# Patient Record
Sex: FEMALE | Race: WHITE | NOT HISPANIC OR LATINO | Employment: OTHER | URBAN - METROPOLITAN AREA
[De-identification: names, ages, dates, MRNs, and addresses within clinical notes are randomized per-mention and may not be internally consistent; named-entity substitution may affect disease eponyms.]

---

## 2020-12-03 ENCOUNTER — TRANSFERRED RECORDS (OUTPATIENT)
Dept: HEALTH INFORMATION MANAGEMENT | Facility: CLINIC | Age: 65
End: 2020-12-03

## 2020-12-03 LAB — RETINOPATHY: NEGATIVE

## 2021-08-11 ENCOUNTER — LAB REQUISITION (OUTPATIENT)
Dept: LAB | Facility: CLINIC | Age: 66
End: 2021-08-11
Payer: COMMERCIAL

## 2021-08-11 DIAGNOSIS — U07.1 COVID-19: ICD-10-CM

## 2021-08-12 PROCEDURE — U0003 INFECTIOUS AGENT DETECTION BY NUCLEIC ACID (DNA OR RNA); SEVERE ACUTE RESPIRATORY SYNDROME CORONAVIRUS 2 (SARS-COV-2) (CORONAVIRUS DISEASE [COVID-19]), AMPLIFIED PROBE TECHNIQUE, MAKING USE OF HIGH THROUGHPUT TECHNOLOGIES AS DESCRIBED BY CMS-2020-01-R: HCPCS | Mod: ORL | Performed by: FAMILY MEDICINE

## 2021-08-13 LAB — SARS-COV-2 RNA RESP QL NAA+PROBE: NEGATIVE

## 2021-08-16 ENCOUNTER — LAB REQUISITION (OUTPATIENT)
Dept: LAB | Facility: CLINIC | Age: 66
End: 2021-08-16
Payer: COMMERCIAL

## 2021-08-16 DIAGNOSIS — U07.1 COVID-19: ICD-10-CM

## 2021-08-18 PROCEDURE — U0003 INFECTIOUS AGENT DETECTION BY NUCLEIC ACID (DNA OR RNA); SEVERE ACUTE RESPIRATORY SYNDROME CORONAVIRUS 2 (SARS-COV-2) (CORONAVIRUS DISEASE [COVID-19]), AMPLIFIED PROBE TECHNIQUE, MAKING USE OF HIGH THROUGHPUT TECHNOLOGIES AS DESCRIBED BY CMS-2020-01-R: HCPCS | Mod: ORL | Performed by: FAMILY MEDICINE

## 2021-08-20 LAB — SARS-COV-2 RNA RESP QL NAA+PROBE: NEGATIVE

## 2021-08-25 ENCOUNTER — LAB REQUISITION (OUTPATIENT)
Dept: LAB | Facility: CLINIC | Age: 66
End: 2021-08-25
Payer: COMMERCIAL

## 2021-08-25 DIAGNOSIS — U07.1 COVID-19: ICD-10-CM

## 2021-08-26 PROCEDURE — U0005 INFEC AGEN DETEC AMPLI PROBE: HCPCS | Mod: ORL | Performed by: FAMILY MEDICINE

## 2021-08-28 LAB — SARS-COV-2 RNA RESP QL NAA+PROBE: NEGATIVE

## 2021-09-17 ENCOUNTER — LAB REQUISITION (OUTPATIENT)
Dept: LAB | Facility: CLINIC | Age: 66
End: 2021-09-17
Payer: COMMERCIAL

## 2021-09-17 DIAGNOSIS — U07.1 COVID-19: ICD-10-CM

## 2021-09-17 PROCEDURE — U0003 INFECTIOUS AGENT DETECTION BY NUCLEIC ACID (DNA OR RNA); SEVERE ACUTE RESPIRATORY SYNDROME CORONAVIRUS 2 (SARS-COV-2) (CORONAVIRUS DISEASE [COVID-19]), AMPLIFIED PROBE TECHNIQUE, MAKING USE OF HIGH THROUGHPUT TECHNOLOGIES AS DESCRIBED BY CMS-2020-01-R: HCPCS | Mod: ORL | Performed by: FAMILY MEDICINE

## 2021-09-19 LAB — SARS-COV-2 RNA RESP QL NAA+PROBE: NOT DETECTED

## 2021-09-20 PROCEDURE — U0003 INFECTIOUS AGENT DETECTION BY NUCLEIC ACID (DNA OR RNA); SEVERE ACUTE RESPIRATORY SYNDROME CORONAVIRUS 2 (SARS-COV-2) (CORONAVIRUS DISEASE [COVID-19]), AMPLIFIED PROBE TECHNIQUE, MAKING USE OF HIGH THROUGHPUT TECHNOLOGIES AS DESCRIBED BY CMS-2020-01-R: HCPCS | Mod: ORL | Performed by: FAMILY MEDICINE

## 2021-09-22 ENCOUNTER — LAB REQUISITION (OUTPATIENT)
Dept: LAB | Facility: CLINIC | Age: 66
End: 2021-09-22
Payer: COMMERCIAL

## 2021-09-22 DIAGNOSIS — U07.1 COVID-19: ICD-10-CM

## 2021-09-23 LAB — SARS-COV-2 RNA RESP QL NAA+PROBE: NOT DETECTED

## 2022-05-25 ENCOUNTER — LAB REQUISITION (OUTPATIENT)
Dept: LAB | Facility: CLINIC | Age: 67
End: 2022-05-25
Payer: COMMERCIAL

## 2022-05-25 DIAGNOSIS — U07.1 COVID-19: ICD-10-CM

## 2022-05-26 PROCEDURE — U0005 INFEC AGEN DETEC AMPLI PROBE: HCPCS | Mod: ORL | Performed by: INTERNAL MEDICINE

## 2022-05-27 LAB — SARS-COV-2 RNA RESP QL NAA+PROBE: NEGATIVE

## 2022-06-01 ENCOUNTER — LAB REQUISITION (OUTPATIENT)
Dept: LAB | Facility: CLINIC | Age: 67
End: 2022-06-01
Payer: COMMERCIAL

## 2022-06-01 DIAGNOSIS — U07.1 COVID-19: ICD-10-CM

## 2022-06-15 ENCOUNTER — LAB REQUISITION (OUTPATIENT)
Dept: LAB | Facility: CLINIC | Age: 67
End: 2022-06-15
Payer: COMMERCIAL

## 2022-06-15 DIAGNOSIS — U07.1 COVID-19: ICD-10-CM

## 2022-06-16 PROCEDURE — U0005 INFEC AGEN DETEC AMPLI PROBE: HCPCS | Mod: ORL | Performed by: INTERNAL MEDICINE

## 2022-06-17 LAB — SARS-COV-2 RNA RESP QL NAA+PROBE: NEGATIVE

## 2022-06-22 ENCOUNTER — LAB REQUISITION (OUTPATIENT)
Dept: LAB | Facility: CLINIC | Age: 67
End: 2022-06-22
Payer: COMMERCIAL

## 2022-06-22 DIAGNOSIS — U07.1 COVID-19: ICD-10-CM

## 2022-07-26 DIAGNOSIS — H35.30 ARMD (AGE-RELATED MACULAR DEGENERATION), BILATERAL: Primary | ICD-10-CM

## 2022-07-31 ENCOUNTER — HEALTH MAINTENANCE LETTER (OUTPATIENT)
Age: 67
End: 2022-07-31

## 2022-08-08 ASSESSMENT — ANXIETY QUESTIONNAIRES
3. WORRYING TOO MUCH ABOUT DIFFERENT THINGS: MORE THAN HALF THE DAYS
2. NOT BEING ABLE TO STOP OR CONTROL WORRYING: MORE THAN HALF THE DAYS
GAD7 TOTAL SCORE: 12
8. IF YOU CHECKED OFF ANY PROBLEMS, HOW DIFFICULT HAVE THESE MADE IT FOR YOU TO DO YOUR WORK, TAKE CARE OF THINGS AT HOME, OR GET ALONG WITH OTHER PEOPLE?: NOT DIFFICULT AT ALL
GAD7 TOTAL SCORE: 12
7. FEELING AFRAID AS IF SOMETHING AWFUL MIGHT HAPPEN: NEARLY EVERY DAY
1. FEELING NERVOUS, ANXIOUS, OR ON EDGE: NEARLY EVERY DAY
IF YOU CHECKED OFF ANY PROBLEMS ON THIS QUESTIONNAIRE, HOW DIFFICULT HAVE THESE PROBLEMS MADE IT FOR YOU TO DO YOUR WORK, TAKE CARE OF THINGS AT HOME, OR GET ALONG WITH OTHER PEOPLE: NOT DIFFICULT AT ALL
6. BECOMING EASILY ANNOYED OR IRRITABLE: NOT AT ALL
7. FEELING AFRAID AS IF SOMETHING AWFUL MIGHT HAPPEN: NEARLY EVERY DAY
4. TROUBLE RELAXING: MORE THAN HALF THE DAYS
5. BEING SO RESTLESS THAT IT IS HARD TO SIT STILL: NOT AT ALL

## 2022-08-10 ENCOUNTER — OFFICE VISIT (OUTPATIENT)
Dept: OPHTHALMOLOGY | Facility: CLINIC | Age: 67
End: 2022-08-10
Attending: OPHTHALMOLOGY
Payer: COMMERCIAL

## 2022-08-10 DIAGNOSIS — H35.053 CNVM (CHOROIDAL NEOVASCULAR MEMBRANE), BILATERAL: ICD-10-CM

## 2022-08-10 DIAGNOSIS — E11.3299 NONPROLIFERATIVE DIABETIC RETINOPATHY ASSOCIATED WITH TYPE 2 DIABETES MELLITUS (H): ICD-10-CM

## 2022-08-10 DIAGNOSIS — H43.813 POSTERIOR VITREOUS DETACHMENT OF BOTH EYES: ICD-10-CM

## 2022-08-10 DIAGNOSIS — H35.30 ARMD (AGE-RELATED MACULAR DEGENERATION), BILATERAL: ICD-10-CM

## 2022-08-10 DIAGNOSIS — H44.2A3 BOTH EYES AFFECTED BY DEGENERATIVE MYOPIA WITH CHOROIDAL NEOVASCULARIZATION: Primary | ICD-10-CM

## 2022-08-10 DIAGNOSIS — H52.13 MYOPIA OF BOTH EYES: ICD-10-CM

## 2022-08-10 PROCEDURE — 92235 FLUORESCEIN ANGRPH MLTIFRAME: CPT | Mod: 26 | Performed by: STUDENT IN AN ORGANIZED HEALTH CARE EDUCATION/TRAINING PROGRAM

## 2022-08-10 PROCEDURE — G0463 HOSPITAL OUTPT CLINIC VISIT: HCPCS | Mod: 25

## 2022-08-10 PROCEDURE — 99207 FUNDUS AUTOFLUORESCENCE IMAGE (FAF) OU (BOTH EYES): CPT | Mod: 26 | Performed by: STUDENT IN AN ORGANIZED HEALTH CARE EDUCATION/TRAINING PROGRAM

## 2022-08-10 PROCEDURE — 92134 CPTRZ OPH DX IMG PST SGM RTA: CPT | Mod: 26 | Performed by: STUDENT IN AN ORGANIZED HEALTH CARE EDUCATION/TRAINING PROGRAM

## 2022-08-10 PROCEDURE — 92250 FUNDUS PHOTOGRAPHY W/I&R: CPT | Performed by: OPHTHALMOLOGY

## 2022-08-10 PROCEDURE — 92134 CPTRZ OPH DX IMG PST SGM RTA: CPT | Performed by: OPHTHALMOLOGY

## 2022-08-10 PROCEDURE — 92235 FLUORESCEIN ANGRPH MLTIFRAME: CPT | Performed by: OPHTHALMOLOGY

## 2022-08-10 PROCEDURE — 92004 COMPRE OPH EXAM NEW PT 1/>: CPT | Mod: 25 | Performed by: STUDENT IN AN ORGANIZED HEALTH CARE EDUCATION/TRAINING PROGRAM

## 2022-08-10 PROCEDURE — 250N000011 HC RX IP 250 OP 636: Performed by: OPHTHALMOLOGY

## 2022-08-10 PROCEDURE — 67028 INJECTION EYE DRUG: CPT | Mod: LT | Performed by: OPHTHALMOLOGY

## 2022-08-10 PROCEDURE — 67028 INJECTION EYE DRUG: CPT | Mod: LT | Performed by: STUDENT IN AN ORGANIZED HEALTH CARE EDUCATION/TRAINING PROGRAM

## 2022-08-10 RX ORDER — METFORMIN HCL 500 MG
2000 TABLET, EXTENDED RELEASE 24 HR ORAL
COMMUNITY
Start: 2022-05-25 | End: 2022-12-14

## 2022-08-10 RX ORDER — GLIPIZIDE 5 MG/1
5 TABLET, FILM COATED, EXTENDED RELEASE ORAL DAILY
COMMUNITY
Start: 2022-06-16 | End: 2022-08-23

## 2022-08-10 RX ADMIN — Medication 1.25 MG: at 14:53

## 2022-08-10 ASSESSMENT — TONOMETRY
IOP_METHOD: TONOPEN
OD_IOP_MMHG: 15
OS_IOP_MMHG: 14

## 2022-08-10 ASSESSMENT — VISUAL ACUITY
OD_CC+: -1
OS_CC: 20/70
METHOD: SNELLEN - LINEAR
OS_CC+: -2
CORRECTION_TYPE: GLASSES
OD_CC: 20/60
OS_PH_CC: 20/70

## 2022-08-10 ASSESSMENT — SLIT LAMP EXAM - LIDS
COMMENTS: NORMAL
COMMENTS: NORMAL

## 2022-08-10 ASSESSMENT — REFRACTION_WEARINGRX
OD_CYLINDER: +0.75
OD_ADD: +2.25
OS_AXIS: 081
OS_CYLINDER: +0.50
OD_SPHERE: -11.25
OS_SPHERE: -12.50
SPECS_TYPE: PAL
OS_ADD: +2.25
OD_AXIS: 131

## 2022-08-10 ASSESSMENT — EXTERNAL EXAM - LEFT EYE: OS_EXAM: NORMAL

## 2022-08-10 ASSESSMENT — CONF VISUAL FIELD
METHOD: COUNTING FINGERS
OD_INFERIOR_TEMPORAL_RESTRICTION: 3
OS_NORMAL: 1

## 2022-08-10 ASSESSMENT — CUP TO DISC RATIO
OS_RATIO: 0.3
OD_RATIO: 0.4

## 2022-08-10 ASSESSMENT — EXTERNAL EXAM - RIGHT EYE: OD_EXAM: NORMAL

## 2022-08-10 NOTE — NURSING NOTE
"Chief Complaints and History of Present Illnesses   Patient presents with     Consult For     Macular Degeneration referred by Murtaza Trinh MD.     Chief Complaint(s) and History of Present Illness(es)     Consult For     Laterality: both eyes    Context: night driving    Course: gradually worsening    Associated symptoms: glare and floaters.  Negative for eye pain and flashes    Treatments tried: eye drops    Pain scale: 0/10    Comments: Macular Degeneration referred by Murtaza Trinh MD.              Comments     Patient would like to establish care here, after a location change.  She has had myopic degeneration of each eye for many years.  For the past 7-10 years she has gotten shots in her left eye and then in the past 18 months she has had injections in her right eye as well.    Her vision is \"not great\".  She tells me that she needs to get a glasses prescription change and she thinks her cataracts are worsening.    She uses Zaditor daily in each eye.    Justa Cardenas, COT 12:23 PM  August 10, 2022                     "

## 2022-08-10 NOTE — PROGRESS NOTES
CC -   Myopic Degeneration    INTERVAL HISTORY - Initial visit with me    HPI -   Yuko Blanchard is a  66 year old year-old patient presenting for evaluation of myopic degeneration OU and diabetic eye exam. She was previously following with Dr. Murtaza Trinh MD at [facility]. She would like to establish care here as he recently moved to a different location.    Past ocular history of Myopic Degeneration with Retinal Neovascularization OU, Type 2 DM with no insulin use (diagnosed approximately 2018), ERM OS, PVD OU, and cataract OU. Denies curtains, flashing lights, increase in floaters. Reports stable vision.    She has a history of Avastin injections OU (uncertain about the exact number); starting roughly 8 years prior in the left eye and 1.5 years ago in the right eye. She reports most recently, she was extended to a 4 month interval.     Her last injection each eye 4/7/2022    PAST OCULAR SURGERY  -Denies ocular surgery and laser procedures    RETINAL IMAGING:  OCT 08/10/22  OD - PHF off of macula; ERM with schisis and no obvious subretinal fluid  OS - PHF off of macula; ERM with schisis and subretinal fluid inferiorly    FAF 08/10/22  OD- areas of hypo-AF nasal and inferonasal to fovea; window defect at macula but no active CNV  OS- Scattered hyper-AF within large area of hypo-AF inferior macula with leakage     FAF compatible with examination and findings    ASSESSMENT & PLAN    1. Myopic Degeneration with CNV OU    -Last Avastin OU 17 weeks, 6 days prior; on 4 month schedule previously   -No active hemorrhage noted today   -Avastin OS today and f/u 1 month for repeat OCT macula    2. 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     Complete documentation of historical and exam elements from today's encounter can be found in the full encounter summary report (not reduplicated in this progress note). I personally obtained the chief complaint(s) and history of present illness.  I confirmed and edited as necessary the review of systems, past medical/surgical history, family history, social history, and examination findings as documented by others; and I examined the patient myself. I personally reviewed the relevant tests, images, and reports as documented above. I formulated and edited as necessary the assessment and plan and discussed the findings and management plan with the patient and family.     Solo Junior MD

## 2022-08-10 NOTE — PROGRESS NOTES
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  Eye exam shows mild proliferative disease right eye - due in 8/2023 again.   Foot exam normal.  Will check microalbuminuria.  BP within target - will check lipid.  See in 3 months for well exam   - Hemoglobin A1c  - Albumin Random Urine Quantitative with Creat Ratio  - Basic metabolic panel  - Lipid Profile    Encounter for screening mammogram for breast cancer  Due for mammogram  - MA Screening Digital Bilateral    History of colonic polyps  Last colonoscopy several years ago - overdue for colonoscopy  - Adult GI  Referral - Procedure Only    Menopause  Never had a DXA - discussed calcium and vit D  - will get DXA   - Dexa hip/pelvis/spine    BMI:   Estimated body mass index is 27.21 kg/m  as calculated from the following:    Height as of this encounter: 1.638 m (5' 4.5\").    Weight as of this encounter: 73 kg (161 lb).       Regular exercise  Make appt for a wellness exam within 3 months.     Return in about 3 months (around 11/15/2022).    Libby Black MD PhD  [facility]    Time note ((n4, 45'): The total time (on the date of service) for this service was 45 minutes, including discussion/face-to-face, chart review, interpretation not otherwise reported, documentation, and updating of the computerized record.      Dorcas Huntley is a 66 year old, presenting for the following health issues:Diabetes     HPI 65 yo female new patient,  has had type 2 diabetes for about 3 yrs.  On metformin and glipizide.  Last seen for diabetes about a year ago with Jean Marie.  Does bs testing bid:  fasting and 2 hrs pp.  Fasting sugars run around   and 2 hr pp 80 - 120.  No periods of low sugar.  A1C  was 6.1  " "7/2021  Eye exam:  8/2022  Has mild NPDR right eye   Foot exam - no foot sores - no foot numbness    HTN - no hx of HTN - no meds - no home cuff    Lipids: not on statin -      Exercise: walks 1x/day - 60 min total/day   Diet:  Follows the diet  ASA: no    Answers for HPI/ROS submitted by the patient on 8/8/2022  TERRA 7 TOTAL SCORE: 12        Review of Systems endorses visual difficulty, allergies, frequency and urgency, joint pain, stiffness, anxiety and the rest of the complete ROS is negative other that as mentioned above.         Objective    /81   Pulse 67   Ht 1.638 m (5' 4.5\")   Wt 73 kg (161 lb)   BMI 27.21 kg/m    Body mass index is 27.21 kg/m .  Physical Exam   GENERAL: healthy, alert and no distress  NECK: no adenopathy, no asymmetry, masses, or scars and thyroid normal to palpation  RESP: lungs diminished bs bilaterally L>R   No audible  rales, rhonchi or wheezes  CV: regular rate and rhythm, normal S1 S2, no S3 or S4, no murmur, click or rub, no peripheral edema and peripheral pulses present 1-2/4 bilaterally\ABDOMEN: soft, nontender, no hepatosplenomegaly, no masses and bowel sounds normal  MS: no gross musculoskeletal defects noted, no edema  SKIN: no suspicious lesions or rashes  NEURO: Normal strength and tone, mentation intact and speech normal  PSYCH: mentation appears normal, affect normal/bright  LYMPH: no cervical, supraclavicular  adenopathy        Diabetic Foot Screen:  Any complaints of increased pain or numbness ? No   Is there a foot ulcer now or a history of foot ulcer? No   Does the foot have an abnormal shape? No   Are the nails thick, too long or ingrown? No   Are there any redness or open areas? No            Sensation Testing done at all points on the diagram with monofilament     Right Foot: Sensation Normal at all points  Left Foot: Sensation Normal at all points     Risk Category: 0- No loss of protective sensation  Performed by Libby Black MD PhD            "         .  ..

## 2022-08-15 ENCOUNTER — OFFICE VISIT (OUTPATIENT)
Dept: FAMILY MEDICINE | Facility: CLINIC | Age: 67
End: 2022-08-15
Attending: FAMILY MEDICINE
Payer: COMMERCIAL

## 2022-08-15 VITALS
HEART RATE: 67 BPM | BODY MASS INDEX: 26.82 KG/M2 | SYSTOLIC BLOOD PRESSURE: 135 MMHG | WEIGHT: 161 LBS | HEIGHT: 65 IN | DIASTOLIC BLOOD PRESSURE: 81 MMHG

## 2022-08-15 DIAGNOSIS — Z12.31 ENCOUNTER FOR SCREENING MAMMOGRAM FOR BREAST CANCER: ICD-10-CM

## 2022-08-15 DIAGNOSIS — Z86.0100 HISTORY OF COLONIC POLYPS: ICD-10-CM

## 2022-08-15 DIAGNOSIS — Z78.0 MENOPAUSE: ICD-10-CM

## 2022-08-15 DIAGNOSIS — E11.3291: Primary | ICD-10-CM

## 2022-08-15 PROCEDURE — G0463 HOSPITAL OUTPT CLINIC VISIT: HCPCS

## 2022-08-15 PROCEDURE — 99204 OFFICE O/P NEW MOD 45 MIN: CPT | Performed by: FAMILY MEDICINE

## 2022-08-15 RX ORDER — BLOOD SUGAR DIAGNOSTIC
STRIP MISCELLANEOUS
COMMUNITY
Start: 2022-07-29

## 2022-08-15 NOTE — PATIENT INSTRUCTIONS
Blood work when fasting  Schedule mammogram- tomograms  Schedule colonoscopy  Schedule DEXA at the [facility] on [place]  Schedule a wellness  exam visit  Urine specimen when get blood work     Eye exam for  diabetes is 8/2023

## 2022-08-16 ENCOUNTER — TELEPHONE (OUTPATIENT)
Dept: GASTROENTEROLOGY | Facility: CLINIC | Age: 67
End: 2022-08-16

## 2022-08-16 NOTE — TELEPHONE ENCOUNTER
Screening Questions    BlueKIND OF PREP RedLOCATION [review exclusion criteria] GreenSEDATION TYPE      1. Are you active on mychart? Y    2. What insurance is in the chart? [employer]     3.   Ordering/Referring Provider: Libby Black MD PhD in [facility]    4. BMI   (If greater than 40 review exclusion criteria [PAC APPT IF [MAC] @ [facility])  27.2  [If yes, BMI OVER 40-EXTENDED PREP]      **(Sedation review/consideration needed)**  Do you have a legal guardian or Medical Power of    and/or are you able to give consent for your medical care?     Y    5. Have you had a positive covid test in the last 90 days?   N - N    6.  Are you currently on dialysis?   N [ If yes, G-PREP & HOSPITAL setting ONLY]     7.  Do you have chronic kidney disease?  N [ If yes, G-PREP ]    8.   Do you have a diagnosis of diabetes?   Y  [ If yes, G-PREP ]    9.  On a regular basis do you go 3-5 days between bowel movements?   N   [ If yes, EXTENDED PREP]    10.  Are you taking any prescription pain medications on a routine schedule?    N - N [ If yes, EXTENDED PREP] [If yes, MAC]      11.   Do you have any chemical dependencies such as alcohol, street drugs, or methadone?    N [If yes, MAC]    12.   Do you have any history of post-traumatic stress syndrome, severe anxiety or history of psychosis?    N  [If yes, MAC]    13.  [FEMALES] Are you currently pregnant?     If yes, how many weeks?       Respiratory/Heart Screening:  [If yes to any of the following HOSPITAL setting only]     14. Do you have Pulmonary Hypertension [Lungs]?   N       15. Do you have UNCONTROLLED asthma?   N     16.  Do you use daily home oxygen?  N      17. Do you have mod to severe Obstructive Sleep Apnea?         (OKAY @ [facility]  [facility]  SH  PH  RI  MG - if pt is not on OXYGEN)  N      18.   Have you had a heart or lung transplant?   N      19.   Have you had a stroke or Transient ischemic attack (TIA - aka  mini stroke ) within 6 months?  (If yes,  please review exclusion criteria)  N     20.   In the past 6 months, have you had any heart related issues including cardiomyopathy or heart attack?   N           If yes, did it require cardiac stenting or other implantable device?   N      21.   Do you have any implantable devices in your body (pacemaker, defib, LVAD)? (If yes, please review exclusion criteria)  N   22.  Do you take the medication Phentermine?  NO        23. Do you take nitroglycerin?   N           If yes, how often? N  (if yes, HOSPITAL setting ONLY)    24.  Are you currently taking any blood thinners?    [If yes, INFORM patient to follw up w/ ORDERING PROVIDER FOR BRIDGING INSTRUCTIONS]     N    25.   Do you transfer independently?                (If NO, please HOSPITAL setting ONLY)  Y    26.   Preferred LOCAL Pharmacy for Pre Prescription:      [facility]?  [id number] [facility]    Scheduling Details  (Please ask for phone number if not scheduled by patient)    Caller : Yuko  Date of Procedure: 10/13   Surgeon: CLAIRE Holley  [place] [facility]    Sedation Type: CS l per protocol & per order  Conscious Sedation- Needs  for 6 hours after the procedure  MAC/General-Needs  for 24 hours after procedure    n :[Pre-op Required] at [facility]  SH  MG and OR for MAC sedation   (advise patient they will need a pre-op WITH IN 30 DAYS of procedure date)     Type of Procedure Scheduled:   Lower Endoscopy [Colonoscopy]    Which Colonoscopy Prep was Sent?:   golytely - diabetes    [facility] PATIENTS & [facility] PATIENTS NEEDS EXTENDED PREP    Informed patient they will need an adult  y  Cannot take any type of public or medical transportation alone    Pre-Procedure Covid test to be completed at [facility] or Externally: home    Confirmed Nurse will call to complete assessment y    Additional comments: none

## 2022-08-23 DIAGNOSIS — E11.3291: Primary | ICD-10-CM

## 2022-08-23 NOTE — TELEPHONE ENCOUNTER
[facility] Call Center    Phone Message    May a detailed message be left on voicemail: yes     Reason for Call: Medication Question or concern regarding medication   Prescription Clarification  Name of Medication: glipiZIDE (GLUCOTROL XL) 5 MG 24 hr tablet  Prescribing Provider: Dr. Black was going to refill 8/15/22   Pharmacy: [facility] mail order [facility] #[phone]   What on the order needs clarification? Yuko is calling stating that the prescription for glipiZIDE (GLUCOTROL XL) 5 MG 24 hr tablet is supposed to [facility] as they sent a fax to the clinic to get Dr. Black's approval for mail order. Please complete and return, thanks!          Action Taken: Other: whs    Travel Screening: Not Applicable

## 2022-08-24 RX ORDER — GLIPIZIDE 5 MG/1
5 TABLET, FILM COATED, EXTENDED RELEASE ORAL DAILY
Qty: 90 TABLET | Refills: 3 | Status: SHIPPED | OUTPATIENT
Start: 2022-08-24 | End: 2022-12-19

## 2022-09-07 DIAGNOSIS — H35.30 ARMD (AGE-RELATED MACULAR DEGENERATION), BILATERAL: Primary | ICD-10-CM

## 2022-09-09 ENCOUNTER — ANCILLARY PROCEDURE (OUTPATIENT)
Dept: MAMMOGRAPHY | Facility: CLINIC | Age: 67
End: 2022-09-09
Attending: FAMILY MEDICINE
Payer: COMMERCIAL

## 2022-09-09 DIAGNOSIS — Z12.31 ENCOUNTER FOR SCREENING MAMMOGRAM FOR BREAST CANCER: ICD-10-CM

## 2022-09-09 PROCEDURE — 77067 SCR MAMMO BI INCL CAD: CPT | Mod: GC | Performed by: RADIOLOGY

## 2022-09-14 ENCOUNTER — OFFICE VISIT (OUTPATIENT)
Dept: OPHTHALMOLOGY | Facility: CLINIC | Age: 67
End: 2022-09-14
Attending: OPHTHALMOLOGY
Payer: COMMERCIAL

## 2022-09-14 DIAGNOSIS — H44.2A3 BOTH EYES AFFECTED BY DEGENERATIVE MYOPIA WITH CHOROIDAL NEOVASCULARIZATION: ICD-10-CM

## 2022-09-14 DIAGNOSIS — H43.813 POSTERIOR VITREOUS DETACHMENT OF BOTH EYES: ICD-10-CM

## 2022-09-14 DIAGNOSIS — H35.052 CHOROIDAL NEOVASCULAR MEMBRANE OF LEFT EYE: Primary | ICD-10-CM

## 2022-09-14 DIAGNOSIS — E11.3299 NONPROLIFERATIVE DIABETIC RETINOPATHY ASSOCIATED WITH TYPE 2 DIABETES MELLITUS (H): ICD-10-CM

## 2022-09-14 PROCEDURE — 92134 CPTRZ OPH DX IMG PST SGM RTA: CPT | Performed by: OPHTHALMOLOGY

## 2022-09-14 PROCEDURE — 250N000011 HC RX IP 250 OP 636: Performed by: OPHTHALMOLOGY

## 2022-09-14 PROCEDURE — G0463 HOSPITAL OUTPT CLINIC VISIT: HCPCS

## 2022-09-14 PROCEDURE — 92014 COMPRE OPH EXAM EST PT 1/>: CPT | Mod: 25 | Performed by: OPHTHALMOLOGY

## 2022-09-14 PROCEDURE — 67028 INJECTION EYE DRUG: CPT | Mod: LT | Performed by: OPHTHALMOLOGY

## 2022-09-14 RX ADMIN — Medication 1.25 MG: at 10:30

## 2022-09-14 ASSESSMENT — CUP TO DISC RATIO
OD_RATIO: 0.4
OS_RATIO: 0.3

## 2022-09-14 ASSESSMENT — SLIT LAMP EXAM - LIDS
COMMENTS: NORMAL
COMMENTS: NORMAL

## 2022-09-14 ASSESSMENT — VISUAL ACUITY
OS_CC+: -1
METHOD: SNELLEN - LINEAR
OS_PH_CC: 20/70
OS_CC: 20/70
OD_PH_CC: 20/70
CORRECTION_TYPE: GLASSES
OD_CC: 20/100
OS_PH_CC+: +2

## 2022-09-14 ASSESSMENT — EXTERNAL EXAM - RIGHT EYE: OD_EXAM: NORMAL

## 2022-09-14 ASSESSMENT — EXTERNAL EXAM - LEFT EYE: OS_EXAM: NORMAL

## 2022-09-14 ASSESSMENT — TONOMETRY
OS_IOP_MMHG: 15
IOP_METHOD: TONOPEN
OD_IOP_MMHG: 18

## 2022-09-14 ASSESSMENT — CONF VISUAL FIELD
OD_NORMAL: 1
OS_INFERIOR_TEMPORAL_RESTRICTION: 3
METHOD: COUNTING FINGERS

## 2022-09-14 NOTE — NURSING NOTE
Chief Complaints and History of Present Illnesses   Patient presents with     Follow Up     Both eyes affected by degenerative myopia with choroidal neovascularization     Chief Complaint(s) and History of Present Illness(es)     Follow Up     Comments: Both eyes affected by degenerative myopia with choroidal neovascularization              Comments     Pt states no change in VA since last visit  Floaters same as before  No flashes, eye pain or redness    Margot Hernandez COT 9:37 AM September 14, 2022

## 2022-09-14 NOTE — PROGRESS NOTES
CC -   Myopic Degeneration    INTERVAL HISTORY -vision stable; no change after the injection last visit    HPI -   Yuko Blanchard is a  66 year old year-old patient presenting for evaluation of myopic degeneration OU and diabetic eye exam. She was previously following with Dr. Murtaza Trinh MD at [facility]. She would like to establish care here as he recently moved to a different location.    Past ocular history of Myopic Degeneration with Retinal Neovascularization OU, Type 2 DM with no insulin use (diagnosed approximately 2018), ERM OS, PVD OU, and cataract OU. Denies curtains, flashing lights, increase in floaters. Reports stable vision.    She has a history of Avastin injections OU (uncertain about the exact number); starting roughly 8 years prior in the left eye and 1.5 years ago in the right eye. She reports most recently, she was extended to a 4 month interval.     Her last injection each eye 4/7/2022    PAST OCULAR SURGERY  -Denies ocular surgery and laser procedures    RETINAL IMAGING:  OCT 9/14/22  OD - PHF off of macula; ERM with schisis and no obvious subretinal fluid  OS - PHF off of macula; ERM with schisis and subretinal fluid inferiorly; stable or maybe slight improvement    FAF 08/10/22  OD- areas of hypo-AF nasal and inferonasal to fovea; window defect at macula but no active CNV  OS- Scattered hyper-AF within large area of hypo-AF inferior macula with leakage     FAF compatible with examination and findings    ASSESSMENT & PLAN    1. Myopic Degeneration with CNV OU    -manu Avastin each eye; came here after a 17 weeks hiatus    -No active hemorrhage noted    -right eye no signs of active CNVM; left eye subretinal fluid likely in the setting of myopic degeneration vs active CNVM   -avastin left eye 8/10/22: minimal change in anatomy and no change in vision   -Avastin OS 8/10/22: OCT today 9/14/22 minimal or no improvement   - repeat intravitreal avastin today, RTC 4 - 6 w; if  no change in vision and OCT: will continue to observe with no injection    2. 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 I personally obtained the chief complaint(s) and history of present illness.  I confirmed and edited as necessary the review of systems, past medical/surgical history, family history, social history, and examination findings as documented by others; and I examined the patient myself. I personally reviewed the relevant tests, images, and reports as documented above. I formulated and edited as necessary the assessment and plan and discussed the findings and management plan with the patient and family.     Solo Junior MD

## 2022-10-04 ENCOUNTER — TELEPHONE (OUTPATIENT)
Dept: GASTROENTEROLOGY | Facility: CLINIC | Age: 67
End: 2022-10-04

## 2022-10-04 DIAGNOSIS — Z12.11 ENCOUNTER FOR SCREENING COLONOSCOPY: Primary | ICD-10-CM

## 2022-10-04 RX ORDER — BISACODYL 5 MG/1
TABLET, DELAYED RELEASE ORAL
Qty: 4 TABLET | Refills: 0 | Status: SHIPPED | OUTPATIENT
Start: 2022-10-04 | End: 2022-10-18

## 2022-10-04 NOTE — TELEPHONE ENCOUNTER
Pre assessment questions completed for upcoming colonoscopy  procedure scheduled on 10/13/22    COVID policy reviewed. Patient to complete rapid antigen test one to two days before their scheduled procedure. Patient to bring photo of the results when they come in for their procedure.    Reviewed procedural arrival time 1030 and facility location [facility].    Designated  policy reviewed. Instructed to have someone stay 6 hours post procedure.     Procedure indication: screening     Bowel prep recommendation: Golytely d/t DM    Golytely  [place] [address]. Prep instructions sent via [facility].    Reviewed Golytely prep instructions. No fiber/iron supplements or foods that contain nuts/seeds 7 days prior to procedure.     Anticoagulation/blood thinners? no    Patient verbalized understanding and had no questions or concerns at this time.    Patricia Martinez RN

## 2022-10-05 DIAGNOSIS — H44.2A3 BOTH EYES AFFECTED BY DEGENERATIVE MYOPIA WITH CHOROIDAL NEOVASCULARIZATION: Primary | ICD-10-CM

## 2022-10-13 ENCOUNTER — HOSPITAL ENCOUNTER (OUTPATIENT)
Facility: AMBULATORY SURGERY CENTER | Age: 67
Discharge: HOME | End: 2022-10-13
Attending: INTERNAL MEDICINE | Admitting: INTERNAL MEDICINE
Payer: COMMERCIAL

## 2022-10-13 VITALS
WEIGHT: 161 LBS | DIASTOLIC BLOOD PRESSURE: 67 MMHG | HEART RATE: 61 BPM | RESPIRATION RATE: 20 BRPM | HEIGHT: 65 IN | BODY MASS INDEX: 26.82 KG/M2 | SYSTOLIC BLOOD PRESSURE: 115 MMHG | TEMPERATURE: 97.3 F | OXYGEN SATURATION: 94 %

## 2022-10-13 LAB
COLONOSCOPY: NORMAL
GLUCOSE BLDC GLUCOMTR-MCNC: 127 MG/DL (ref 70–99)

## 2022-10-13 PROCEDURE — 82962 GLUCOSE BLOOD TEST: CPT | Performed by: PATHOLOGY

## 2022-10-13 PROCEDURE — 45380 COLONOSCOPY AND BIOPSY: CPT | Mod: PT

## 2022-10-13 PROCEDURE — 88305 TISSUE EXAM BY PATHOLOGIST: CPT | Mod: TC | Performed by: INTERNAL MEDICINE

## 2022-10-13 PROCEDURE — 88305 TISSUE EXAM BY PATHOLOGIST: CPT | Mod: 26 | Performed by: STUDENT IN AN ORGANIZED HEALTH CARE EDUCATION/TRAINING PROGRAM

## 2022-10-13 RX ORDER — ONDANSETRON 2 MG/ML
4 INJECTION INTRAMUSCULAR; INTRAVENOUS
Status: DISCONTINUED | OUTPATIENT
Start: 2022-10-13 | End: 2022-10-13 | Stop reason: HOSPADM

## 2022-10-13 RX ORDER — NALOXONE HYDROCHLORIDE 0.4 MG/ML
0.4 INJECTION, SOLUTION INTRAMUSCULAR; INTRAVENOUS; SUBCUTANEOUS
Status: DISCONTINUED | OUTPATIENT
Start: 2022-10-13 | End: 2022-10-15 | Stop reason: HOSPADM

## 2022-10-13 RX ORDER — FENTANYL CITRATE 50 UG/ML
INJECTION, SOLUTION INTRAMUSCULAR; INTRAVENOUS DAILY PRN
Status: DISCONTINUED | OUTPATIENT
Start: 2022-10-13 | End: 2022-10-13 | Stop reason: HOSPADM

## 2022-10-13 RX ORDER — SODIUM CHLORIDE, SODIUM LACTATE, POTASSIUM CHLORIDE, CALCIUM CHLORIDE 600; 310; 30; 20 MG/100ML; MG/100ML; MG/100ML; MG/100ML
INJECTION, SOLUTION INTRAVENOUS CONTINUOUS
Status: DISCONTINUED | OUTPATIENT
Start: 2022-10-13 | End: 2022-10-13 | Stop reason: HOSPADM

## 2022-10-13 RX ORDER — NALOXONE HYDROCHLORIDE 0.4 MG/ML
0.2 INJECTION, SOLUTION INTRAMUSCULAR; INTRAVENOUS; SUBCUTANEOUS
Status: DISCONTINUED | OUTPATIENT
Start: 2022-10-13 | End: 2022-10-15 | Stop reason: HOSPADM

## 2022-10-13 RX ORDER — PROCHLORPERAZINE MALEATE 5 MG
5 TABLET ORAL EVERY 6 HOURS PRN
Status: DISCONTINUED | OUTPATIENT
Start: 2022-10-13 | End: 2022-10-15 | Stop reason: HOSPADM

## 2022-10-13 RX ORDER — ONDANSETRON 2 MG/ML
4 INJECTION INTRAMUSCULAR; INTRAVENOUS EVERY 6 HOURS PRN
Status: DISCONTINUED | OUTPATIENT
Start: 2022-10-13 | End: 2022-10-15 | Stop reason: HOSPADM

## 2022-10-13 RX ORDER — FLUMAZENIL 0.1 MG/ML
0.2 INJECTION, SOLUTION INTRAVENOUS
Status: DISCONTINUED | OUTPATIENT
Start: 2022-10-13 | End: 2022-10-14 | Stop reason: HOSPADM

## 2022-10-13 RX ORDER — LIDOCAINE 40 MG/G
CREAM TOPICAL
Status: DISCONTINUED | OUTPATIENT
Start: 2022-10-13 | End: 2022-10-13 | Stop reason: HOSPADM

## 2022-10-13 RX ORDER — ONDANSETRON 4 MG/1
4 TABLET, ORALLY DISINTEGRATING ORAL EVERY 6 HOURS PRN
Status: DISCONTINUED | OUTPATIENT
Start: 2022-10-13 | End: 2022-10-15 | Stop reason: HOSPADM

## 2022-10-16 ENCOUNTER — HEALTH MAINTENANCE LETTER (OUTPATIENT)
Age: 67
End: 2022-10-16

## 2022-10-18 ENCOUNTER — OFFICE VISIT (OUTPATIENT)
Dept: OPHTHALMOLOGY | Facility: CLINIC | Age: 67
End: 2022-10-18
Attending: OPHTHALMOLOGY
Payer: COMMERCIAL

## 2022-10-18 DIAGNOSIS — H35.052 CHOROIDAL NEOVASCULAR MEMBRANE OF LEFT EYE: Primary | ICD-10-CM

## 2022-10-18 DIAGNOSIS — H43.813 POSTERIOR VITREOUS DETACHMENT OF BOTH EYES: ICD-10-CM

## 2022-10-18 DIAGNOSIS — E11.3299 NONPROLIFERATIVE DIABETIC RETINOPATHY ASSOCIATED WITH TYPE 2 DIABETES MELLITUS (H): ICD-10-CM

## 2022-10-18 DIAGNOSIS — H44.2A3 BOTH EYES AFFECTED BY DEGENERATIVE MYOPIA WITH CHOROIDAL NEOVASCULARIZATION: ICD-10-CM

## 2022-10-18 LAB
PATH REPORT.COMMENTS IMP SPEC: NORMAL
PATH REPORT.COMMENTS IMP SPEC: NORMAL
PATH REPORT.FINAL DX SPEC: NORMAL
PATH REPORT.GROSS SPEC: NORMAL
PATH REPORT.MICROSCOPIC SPEC OTHER STN: NORMAL
PATH REPORT.RELEVANT HX SPEC: NORMAL
PHOTO IMAGE: NORMAL

## 2022-10-18 PROCEDURE — G0463 HOSPITAL OUTPT CLINIC VISIT: HCPCS

## 2022-10-18 PROCEDURE — 92250 FUNDUS PHOTOGRAPHY W/I&R: CPT | Mod: 59 | Performed by: OPHTHALMOLOGY

## 2022-10-18 PROCEDURE — 99214 OFFICE O/P EST MOD 30 MIN: CPT | Performed by: OPHTHALMOLOGY

## 2022-10-18 PROCEDURE — 99207 FUNDUS PHOTOS OU (BOTH EYES): CPT | Mod: 26 | Performed by: OPHTHALMOLOGY

## 2022-10-18 PROCEDURE — 92134 CPTRZ OPH DX IMG PST SGM RTA: CPT | Performed by: OPHTHALMOLOGY

## 2022-10-18 RX ORDER — GLIPIZIDE 5 MG/1
5 TABLET, FILM COATED, EXTENDED RELEASE ORAL DAILY
COMMUNITY
End: 2022-12-14

## 2022-10-18 ASSESSMENT — REFRACTION_WEARINGRX
OS_SPHERE: -12.50
OD_SPHERE: -11.25
OD_AXIS: 131
OD_CYLINDER: +0.75
OS_CYLINDER: +0.50
OS_ADD: +2.25
OS_AXIS: 081
OD_ADD: +2.25
SPECS_TYPE: PAL

## 2022-10-18 ASSESSMENT — VISUAL ACUITY
METHOD: SNELLEN - LINEAR
OD_PH_CC+: -2
OD_PH_CC: 20/50
OD_CC: 20/80
OS_CC+: -2
OD_CC+: -1
OS_CC: 20/70

## 2022-10-18 ASSESSMENT — TONOMETRY
IOP_METHOD: TONOPEN
OD_IOP_MMHG: 15
OS_IOP_MMHG: 16

## 2022-10-18 ASSESSMENT — CONF VISUAL FIELD
METHOD: COUNTING FINGERS
OS_SUPERIOR_TEMPORAL_RESTRICTION: 0
OS_NORMAL: 1
OS_INFERIOR_TEMPORAL_RESTRICTION: 0
OS_SUPERIOR_NASAL_RESTRICTION: 0
OD_NORMAL: 1
OD_SUPERIOR_TEMPORAL_RESTRICTION: 0
OD_SUPERIOR_NASAL_RESTRICTION: 0
OD_INFERIOR_NASAL_RESTRICTION: 0
OD_INFERIOR_TEMPORAL_RESTRICTION: 0
OS_INFERIOR_NASAL_RESTRICTION: 0

## 2022-10-18 ASSESSMENT — EXTERNAL EXAM - LEFT EYE: OS_EXAM: NORMAL

## 2022-10-18 ASSESSMENT — CUP TO DISC RATIO
OD_RATIO: 0.4
OS_RATIO: 0.3

## 2022-10-18 ASSESSMENT — SLIT LAMP EXAM - LIDS
COMMENTS: NORMAL
COMMENTS: NORMAL

## 2022-10-18 ASSESSMENT — EXTERNAL EXAM - RIGHT EYE: OD_EXAM: NORMAL

## 2022-10-18 NOTE — NURSING NOTE
Chief Complaints and History of Present Illnesses   Patient presents with     Degenerative Myopia Follow Up     Chief Complaint(s) and History of Present Illness(es)     Degenerative Myopia Follow Up            Laterality: both eyes    Onset: gradual    Associated symptoms: Negative for dryness, eye pain, photophobia, flashes, floaters, itching and discharge    Pain scale: 0/10          Comments    Yuko is here to follow up on both eyes affected by degenerative myopia with choroidal neovascularization. She states no vision change since last visit    Bhupinder Ordonez COT 10:11 AM October 18, 2022

## 2022-10-18 NOTE — PROGRESS NOTES
CC -   Myopic Degeneration    INTERVAL HISTORY -vision stable; no change after the injection last visit    HPI -   Yuko Blanchard is a  66 year old year-old patient presenting for evaluation of myopic degeneration OU and diabetic eye exam. She was previously following with Dr. Murtaza Trinh MD at [facility]. She would like to establish care here as he recently moved to a different location.    Past ocular history of Myopic Degeneration with Retinal Neovascularization OU, Type 2 DM with no insulin use (diagnosed approximately 2018), ERM OS, PVD OU, and cataract OU. Denies curtains, flashing lights, increase in floaters. Reports stable vision.    She has a history of Avastin injections OU (uncertain about the exact number); starting roughly 8 years prior in the left eye and 1.5 years ago in the right eye. She reports most recently, she was extended to a 4 month interval.     Her last injection each eye 4/7/2022    PAST OCULAR SURGERY  -Denies ocular surgery and laser procedures    RETINAL IMAGING:  OCT 9/14/22 and 10/18/22  OD - PHF off of macula; ERM with schisis over the post staphyloma area and no obvious subretinal fluid  OS - PHF off of macula; ERM with schisis and subretinal fluid inferiorly over the post staphyloma area; stable    FAF 08/10/22  OD- areas of hypo-AF nasal and inferonasal to fovea; window defect at macula but no active CNV  OS- Scattered hyper-AF within large area of hypo-AF inferior macula with leakage     FAF compatible with examination and findings    ASSESSMENT & PLAN    # Myopic Degeneration with CNV OU    -Avastin each eye; came here after a 17 weeks hiatus    -No active hemorrhage noted    -right eye no signs of active CNVM; left eye subretinal fluid likely in the setting of myopic degeneration vs active CNVM   -avastin left eye 8/10/22 and 9/14/22: minimal change in anatomy and no change in vision     -at this point, SRF and IRF (schisis) is likely due to myopic  degeneration (over post staphyloma area) not myopic CNVM: observe with no injections at this time   - RTC 3 months for DFE and OCT     # PVD OU    -Signs and symptoms of RD/RT discussed 08/10/22    # DM Type II with mild NPDR right eye no signs of DR left eye   -Last A1c 6.1 on 9/8/21   -Good BP and blood glucose control recommended    # High Myopia OU   - peripheral examination unremarkable   - 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 I personally obtained the chief complaint(s) and history of present illness.  I confirmed and edited as necessary the review of systems, past medical/surgical history, family history, social history, and examination findings as documented by others; and I examined the patient myself. I personally reviewed the relevant tests, images, and reports as documented above. I formulated and edited as necessary the assessment and plan and discussed the findings and management plan with the patient and family.     Solo Junior MD

## 2022-10-20 ENCOUNTER — LAB (OUTPATIENT)
Dept: LAB | Facility: CLINIC | Age: 67
End: 2022-10-20
Payer: COMMERCIAL

## 2022-10-20 DIAGNOSIS — E11.3291: ICD-10-CM

## 2022-10-20 LAB
ANION GAP SERPL CALCULATED.3IONS-SCNC: 10 MMOL/L (ref 7–15)
BUN SERPL-MCNC: 9.8 MG/DL (ref 8–23)
CALCIUM SERPL-MCNC: 10.1 MG/DL (ref 8.8–10.2)
CHLORIDE SERPL-SCNC: 101 MMOL/L (ref 98–107)
CHOLEST SERPL-MCNC: 175 MG/DL
CREAT SERPL-MCNC: 0.61 MG/DL (ref 0.51–0.95)
CREAT UR-MCNC: 23.4 MG/DL
DEPRECATED HCO3 PLAS-SCNC: 25 MMOL/L (ref 22–29)
GFR SERPL CREATININE-BSD FRML MDRD: >90 ML/MIN/1.73M2
GLUCOSE SERPL-MCNC: 134 MG/DL (ref 70–99)
HBA1C MFR BLD: 6 %
HDLC SERPL-MCNC: 40 MG/DL
LDLC SERPL CALC-MCNC: 97 MG/DL
MICROALBUMIN UR-MCNC: <12 MG/L
MICROALBUMIN/CREAT UR: NORMAL MG/G{CREAT}
NONHDLC SERPL-MCNC: 135 MG/DL
POTASSIUM SERPL-SCNC: 4.4 MMOL/L (ref 3.4–5.3)
SODIUM SERPL-SCNC: 136 MMOL/L (ref 136–145)
TRIGL SERPL-MCNC: 191 MG/DL

## 2022-10-20 PROCEDURE — 99000 SPECIMEN HANDLING OFFICE-LAB: CPT | Performed by: PATHOLOGY

## 2022-10-20 PROCEDURE — 83036 HEMOGLOBIN GLYCOSYLATED A1C: CPT | Mod: 90 | Performed by: PATHOLOGY

## 2022-10-20 PROCEDURE — 82043 UR ALBUMIN QUANTITATIVE: CPT | Mod: 90 | Performed by: PATHOLOGY

## 2022-10-20 PROCEDURE — 80061 LIPID PANEL: CPT | Mod: 90 | Performed by: PATHOLOGY

## 2022-10-20 PROCEDURE — 36415 COLL VENOUS BLD VENIPUNCTURE: CPT | Performed by: PATHOLOGY

## 2022-10-20 PROCEDURE — 80048 BASIC METABOLIC PNL TOTAL CA: CPT | Performed by: PATHOLOGY

## 2022-10-25 ENCOUNTER — OFFICE VISIT (OUTPATIENT)
Dept: OPHTHALMOLOGY | Facility: CLINIC | Age: 67
End: 2022-10-25
Attending: OPHTHALMOLOGY
Payer: COMMERCIAL

## 2022-10-25 DIAGNOSIS — H44.2A3 BOTH EYES AFFECTED BY DEGENERATIVE MYOPIA WITH CHOROIDAL NEOVASCULARIZATION: ICD-10-CM

## 2022-10-25 DIAGNOSIS — H25.813 COMBINED FORM OF AGE-RELATED CATARACT, BOTH EYES: Primary | ICD-10-CM

## 2022-10-25 DIAGNOSIS — E11.3299 NONPROLIFERATIVE DIABETIC RETINOPATHY ASSOCIATED WITH TYPE 2 DIABETES MELLITUS (H): ICD-10-CM

## 2022-10-25 PROCEDURE — G0463 HOSPITAL OUTPT CLINIC VISIT: HCPCS | Mod: 25

## 2022-10-25 PROCEDURE — 99214 OFFICE O/P EST MOD 30 MIN: CPT | Performed by: OPHTHALMOLOGY

## 2022-10-25 PROCEDURE — 76519 ECHO EXAM OF EYE: CPT | Performed by: OPHTHALMOLOGY

## 2022-10-25 PROCEDURE — 92015 DETERMINE REFRACTIVE STATE: CPT

## 2022-10-25 ASSESSMENT — VISUAL ACUITY
OD_PH_CC: 20/50
METHOD: SNELLEN - LINEAR
OD_CC: 20/80
OD_CC+: +1
OS_CC+: -2
OS_CC: 20/60

## 2022-10-25 ASSESSMENT — CONF VISUAL FIELD
OS_SUPERIOR_NASAL_RESTRICTION: 0
OS_INFERIOR_NASAL_RESTRICTION: 0
METHOD: COUNTING FINGERS
OD_NORMAL: 1
OS_SUPERIOR_TEMPORAL_RESTRICTION: 0
OS_INFERIOR_TEMPORAL_RESTRICTION: 0
OD_INFERIOR_TEMPORAL_RESTRICTION: 0
OD_SUPERIOR_NASAL_RESTRICTION: 0
OD_INFERIOR_NASAL_RESTRICTION: 0
OD_SUPERIOR_TEMPORAL_RESTRICTION: 0
OS_NORMAL: 1

## 2022-10-25 ASSESSMENT — SLIT LAMP EXAM - LIDS
COMMENTS: NORMAL
COMMENTS: NORMAL

## 2022-10-25 ASSESSMENT — CUP TO DISC RATIO
OD_RATIO: 0.4
OS_RATIO: 0.3

## 2022-10-25 ASSESSMENT — REFRACTION_WEARINGRX
OD_ADD: +2.25
OS_ADD: +2.25
OD_CYLINDER: +0.75
OS_AXIS: 081
OD_AXIS: 131
OD_SPHERE: -11.25
OS_SPHERE: -12.50
OS_CYLINDER: +0.50
SPECS_TYPE: PAL

## 2022-10-25 ASSESSMENT — REFRACTION_MANIFEST
OD_SPHERE: -12.00
OD_ADD: +2.50
OS_SPHERE: -13.00
OS_CYLINDER: SPHERE
OD_CYLINDER: SPHERE
OS_ADD: +2.50

## 2022-10-25 ASSESSMENT — EXTERNAL EXAM - RIGHT EYE: OD_EXAM: NORMAL

## 2022-10-25 ASSESSMENT — TONOMETRY
OD_IOP_MMHG: 21
IOP_METHOD: TONOPEN
OS_IOP_MMHG: 20

## 2022-10-25 ASSESSMENT — EXTERNAL EXAM - LEFT EYE: OS_EXAM: NORMAL

## 2022-10-25 NOTE — Clinical Note
Sara Banda,   Thanks for sending Ms. Blanchard, she is excited for cataract surgery.  Would you like to time the left eye surgery to receive avastin in advance?   Let me know your thoughts,   Jarrell

## 2022-10-25 NOTE — NURSING NOTE
Chief Complaints and History of Present Illnesses   Patient presents with     Cataract     Chief Complaint(s) and History of Present Illness(es)     Cataract           Comments    Patient states that her vision is blurry at distance and near. Near vision has gotten worse more recently. She states that when she is sewing she has to push her glasses up right to her eye to see. Patient states that she does notice glare at night. Does not drive anymore at night. Does not notice any haloes. No pain and irritation. No flashes of light. No floaters.     Ocular Meds:zaditor daily in [facility] Jude COT, October 25, 2022 9:23 AM

## 2022-10-25 NOTE — PROGRESS NOTES
Chief Complaint(s) and History of Present Illness(es)     Cataract           Comments    Patient states that her vision is blurry at distance and near. Near vision   has gotten worse more recently. She states that when she is sewing she has   to push her glasses up right to her eye to see. Patient states that she   does notice glare at night. Does not drive anymore at night. Does not   notice any haloes. No pain and irritation. No flashes of light. No   floaters.     Ocular Meds:zaditor daily in BE     Ty Roque [facility], October 25, 2022 9:23 AM                   Review of systems for the eyes was negative other than the pertinent positives/negatives listed in the HPI.      Assessment & Plan      Yuko Blanchard is a 66 year old female with the following diagnoses:   1. Combined form of age-related cataract, both eyes    2. 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 Nonproliferative diabetic retinopathy associated with type 2 diabetes mellitus (H)         Referral from Dr. Junior for cataract eval   Visually significant both eyes   Risks, benefits and alternatives to cataract extraction/IOL implantation discussed; consent obtained.  Will schedule surgery today    Special equipment/needs:    Anesthesia:Topical  Dilation:Good  Iris expansion:Not needed  Pseudoexfoliation: No pseudoexfoliation  Trypan Blue: Unlikely    Discussed unknown visual potential and likely limitation secondary to myopic macular changes  Will discuss timing of avastin left eye prior to surgery (versus intraoperative injection)   Would like to maintain ability to see near without glasses  Goal -2.75 left eye   Goal -2.5 to - 3.5 right eye pending left eye outcome  Dex/NSAID           Attending Physician Attestation:  Complete documentation of historical and exam elements from today's encounter can be found in the full encounter summary report (not reduplicated in this progress note).  I personally obtained  the chief complaint(s) and history of present illness.  I confirmed and edited as necessary the review of systems, past medical/surgical history, family history, social history, and examination findings as documented by others; and I examined the patient myself.  I personally reviewed the relevant tests, images, and reports as documented above.  I formulated and edited as necessary the assessment and plan and discussed the findings and management plan with the patient and family. Today with Yuko ORTEGA Blanchard, I reviewed the indications, risks, benefits, and alternatives of the proposed surgical procedure including, but not limited to, failure obtain the desired result  and need for additional surgery, bleeding, infection, loss of vision, loss of the eye, and the remote possibility of permanent damage to any organ system or death with the use of anesthesia.  I provided multiple opportunities for the questions, answered all questions to the best of my ability, and confirmed that my answers and my discussion were understood.       - Ap Hernandez MD

## 2022-10-26 ENCOUNTER — TELEPHONE (OUTPATIENT)
Dept: OPHTHALMOLOGY | Facility: CLINIC | Age: 67
End: 2022-10-26

## 2022-10-26 PROBLEM — H25.813 COMBINED FORM OF AGE-RELATED CATARACT, BOTH EYES: Status: ACTIVE | Noted: 2022-10-26

## 2022-10-26 NOTE — TELEPHONE ENCOUNTER
Called patient to schedule surgery with Dr. Hernandez    Date of Surgery: 1/2,1/9    Location of surgery: [facility]    Pre-Op H&P: PCP    Pre/Post Imaging:  No    Discussed COVID-19 Testing: Yes    Post-Op Appt Date: 1/10,1/17    Surgery Packet Mailed: Yes      Additional comments: Spoke with patient she is aware of above dates, will review packet and call with any questions           Anna C. Schoenecker on 10/26/2022 at 12:46 PM

## 2022-11-01 DIAGNOSIS — E11.3291: Primary | ICD-10-CM

## 2022-11-01 NOTE — TELEPHONE ENCOUNTER
Centralized Medication Refill Team note:   metFORMIN (GLUCOPHAGE XR) 500 MG 24 hr tablet  Last Written Prescription Date:  Noted as historical  Last Fill Quantity: ~,   # refills: ~  Last Office Visit : 8/15/22 New to Dr Randolph  Future Office visit:  12/14/22    Routing refill request to provider for review/approval because:  Medication is reported/historical. Dosage?   New to Dr Randolph 8/5/22  -11.11.21 noted as metformin  mg 4 tablets daily/ Care Everywhere  Med list( historical) states metformin  mg: : 2,000 mg daily (with dinner)      11-4-22 phone call - she has historically take metformin 2000mg with dinner  And tolerated this - I spoke with her and encouraged her to take 1000mg bid. She will be seeing me in Jan for a preop.  Libby Black MD, PhD    10/20/22 Hemoglobin A1C <5.7 % 6.0 High

## 2022-11-01 NOTE — TELEPHONE ENCOUNTER
[facility] Call Center    Phone Message    May a detailed message be left on voicemail: yes     Reason for Call: Medication Refill Request    Has the patient contacted the pharmacy for the refill? Yes   Name of medication being requested: metFORMIN (GLUCOPHAGE XR) 500 MG 24 hr tablet  Provider who prescribed the medication:[facility]  Pharmacy: [place], [place] - [address]  Date medication is needed: As soon as possible        Action Taken: Message routed to:  [facility] pcp    Travel Screening: Not Applicable

## 2022-12-12 NOTE — PROGRESS NOTES
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, macular edema presence unspecified, unspecified whether long term insulin use (H)  Good control - can decrease bs testing to 1x/wk, foot exam today and was normal - eye exam was 8/2022     History of colonic polyps  Done 10/2022 f/u in 5 yrs     Encounter for immunization  Tetanus is due   - TDAP VACCINE (Adacel, Boostrix)  [[id number]]    Anxiety  TERRA is 6 but describes panic attacks - doesn't want medicine     Depression Screening Follow Up    PHQ 12/14/2022   PHQ-9 Total Score 5   Q9: Thoughts of better off dead/self-harm past 2 weeks Several days     Last PHQ-9 12/14/2022   1.  Little interest or pleasure in doing things 1   2.  Feeling down, depressed, or hopeless 2   3.  Trouble falling or staying asleep, or sleeping too much 0   4.  Feeling tired or having little energy 0   5.  Poor appetite or overeating 0   6.  Feeling bad about yourself 0   7.  Trouble concentrating 1   8.  Moving slowly or restless 0   Q9: Thoughts of better off dead/self-harm past 2 weeks 1   PHQ-9 Total Score 5   Difficulty at work, home, or with people Not difficult at all     Pt called - offered crisis line - offered mental health referral.  Pt has no plan - just feeling doesn't want to live this way for next 10 yrs.      Pt has cataract surgery scheduled early Jan  and didn't realize she needed a preop.  - will reschedule 12/19 at [facility].      {Return in about 4 months (around 4/14/2023) for Physical Exam.    Libby Black MD PhD  [facility] PRIMARY [facility]    Time note (e5, 40'): The total time (on the date of service) for this service was 76 minutes, including discussion/face-to-face, chart review, interpretation not otherwise reported, documentation, and updating of the computerized record.    Pt was called and discussed crisis numbers and mental health referral sent by my chart.         Dorcas Huntley is  a 67 year old presenting for the following health issues:  Last seen 8/2022 - comes back in follow up.     HPI 66 yo female  patient,  has had type 2 diabetes for about 3 yrs.  On metformin and glipizide.   Does bs testing bid:  fasting and 2 hrs pp.  Fasting sugars run around   and 2 hr pp 80 - 105.  No periods of low sugar.  A1C  was 6.0 in Oct 2022   Eye exam:  8/2022  Has mild NPDR right eye   Foot exam - no foot sores - no foot numbness  Microalbuminuria - 10/2022 and negative  Flu shot yes       HTN - no hx of HTN - no meds - no home cuff  - second reading was 133/66      Lipids: not on statin -   The 10-year ASCVD risk score (Louie RICARDO, et al., 2019) is: 15.9%    Values used to calculate the score:      Age: 67 years      Sex: Female      Is Non- : No      Diabetic: Yes      Tobacco smoker: No      Systolic Blood Pressure: 133 mmHg      Is BP treated: No      HDL Cholesterol: 40 mg/dL      Total Cholesterol: 175 mg/dL  Wants to continue on diet      Anxiety - got worse after retired - tries to do exercise and getting out - does get panic attacks - heart races and gets afraid of travelling or going outside  - TERRA =6; PHQ9= 5  Reports several days wish she were dead - pt did not want medication - will recall and give crisis number and mention mental health counseling  - didn't want MH counseling.      Exercise: walks 1x/day - 60 min total/day   Diet:  Follows the diet  - over the lost 6 yrs slowly and steadily lost weight    ASA: no     Health Maintenance  Mammogram 10/2022 normal  Colonoscopy   10/2022 - few small polyps - f/u 5 yrs  shingrix - 1 shot  - 8/2019         Review of Systems   Constitutional, HEENT, cardiovascular, pulmonary, GI, , musculoskeletal, neuro, skin, endocrine and psych systems are negative, except as otherwise noted.      Objective    /66 (BP Location: Right arm, Patient Position: Sitting, Cuff Size: Adult Regular)   Pulse 88   Resp 16   Ht 1.645  "m (5' 4.75\")   Wt 71.4 kg (157 lb 6.4 oz)   SpO2 95%   Breastfeeding No   BMI 26.40 kg/m    Body mass index is 26.4 kg/m .  Physical Exam   GENERAL: , alert and no distress  NECK: no adenopathy, no asymmetry, masses, or scars and thyroid normal to palpation  RESP: lungs clear to auscultation - no rales, rhonchi or wheezes  CV: regular rate and rhythm, normal S1 S2, no S3 or S4, no murmur, click or rub, no peripheral edema   MS: no gross musculoskeletal defects noted, no edema  SKIN: no suspicious lesions or rashes  NEURO: Normal strength and tone, mentation intact and speech normal  PSYCH: mentation appears normal, affect normal  LYMPH: no cervical,  adenopathy  Diabetic foot exam: normal DP and PT pulses, no trophic changes or ulcerative lesions, normal sensory exam and normal monofilament exam    previous notes reviewed   Libby Black MD, PhD                    "

## 2022-12-14 ENCOUNTER — OFFICE VISIT (OUTPATIENT)
Dept: FAMILY MEDICINE | Facility: CLINIC | Age: 67
End: 2022-12-14
Payer: COMMERCIAL

## 2022-12-14 VITALS
DIASTOLIC BLOOD PRESSURE: 66 MMHG | OXYGEN SATURATION: 95 % | WEIGHT: 157.4 LBS | SYSTOLIC BLOOD PRESSURE: 133 MMHG | RESPIRATION RATE: 16 BRPM | HEIGHT: 65 IN | HEART RATE: 88 BPM | BODY MASS INDEX: 26.23 KG/M2

## 2022-12-14 DIAGNOSIS — F41.9 ANXIETY: ICD-10-CM

## 2022-12-14 DIAGNOSIS — E11.3291: Primary | ICD-10-CM

## 2022-12-14 DIAGNOSIS — Z86.0100 HISTORY OF COLONIC POLYPS: ICD-10-CM

## 2022-12-14 DIAGNOSIS — Z23 ENCOUNTER FOR IMMUNIZATION: ICD-10-CM

## 2022-12-14 PROCEDURE — 99215 OFFICE O/P EST HI 40 MIN: CPT | Performed by: FAMILY MEDICINE

## 2022-12-14 PROCEDURE — 99417 PROLNG OP E/M EACH 15 MIN: CPT | Performed by: FAMILY MEDICINE

## 2022-12-14 ASSESSMENT — ANXIETY QUESTIONNAIRES
7. FEELING AFRAID AS IF SOMETHING AWFUL MIGHT HAPPEN: MORE THAN HALF THE DAYS
IF YOU CHECKED OFF ANY PROBLEMS ON THIS QUESTIONNAIRE, HOW DIFFICULT HAVE THESE PROBLEMS MADE IT FOR YOU TO DO YOUR WORK, TAKE CARE OF THINGS AT HOME, OR GET ALONG WITH OTHER PEOPLE: NOT DIFFICULT AT ALL
6. BECOMING EASILY ANNOYED OR IRRITABLE: NOT AT ALL
GAD7 TOTAL SCORE: 6
5. BEING SO RESTLESS THAT IT IS HARD TO SIT STILL: NOT AT ALL
GAD7 TOTAL SCORE: 6
2. NOT BEING ABLE TO STOP OR CONTROL WORRYING: SEVERAL DAYS
1. FEELING NERVOUS, ANXIOUS, OR ON EDGE: MORE THAN HALF THE DAYS
3. WORRYING TOO MUCH ABOUT DIFFERENT THINGS: SEVERAL DAYS

## 2022-12-14 ASSESSMENT — PATIENT HEALTH QUESTIONNAIRE - PHQ9
5. POOR APPETITE OR OVEREATING: NOT AT ALL
SUM OF ALL RESPONSES TO PHQ QUESTIONS 1-9: 5

## 2022-12-14 NOTE — NURSING NOTE
"Yuko Blanchard is a 67 year old female patient that presents today in clinic for the following:    Chief Complaint   Patient presents with     Follow Up     She reports no new health concerns     Immunization     She reports that she is up-to-date     The patient's allergies and medications were reviewed as noted. A set of vitals were recorded as noted without incident: /66 (BP Location: Right arm, Patient Position: Sitting, Cuff Size: Adult Regular)   Pulse 88   Resp 16   Ht 1.645 m (5' 4.75\")   Wt 71.4 kg (157 lb 6.4 oz)   SpO2 95%   Breastfeeding No   BMI 26.40 kg/m  . The patient does not have any other questions for the provider.    Phuc Redd, EMT  11:44 AM 12/14/2022  "
PROCEDURES:  Repair of middle fossa encephalocele 29-Oct-2020 19:37:31  Leia Rankin

## 2022-12-14 NOTE — PROGRESS NOTES
At the request of Dr. Black because of the patient's PHQ-9 score, I called Yuko and provided her with the following healthcare resources which can be accessed through [employer]:    Mental Health Crisis Resources and Phone Numbers:    [facility]:  [facility]: [phone]  Crisis Residence [facility]: [phone]  Walk-In Counseling Center [facility]: [phone]  COPE 24/7 [place] Mobile Team: [phone]    [facility]: [phone]  Walk-in counseling [facility]: [phone]  Walk-in counseling [facility]: [phone]  Crisis Residence [facility]idence: [phone]  Urgent Care Adult Mental Health:   --Drop-in, 24/7 crisis line, and [facility] Mobile Team [phone]    24/7 CRISIS TEXT LINE: www.crisistextline.org OR text 973-465 anywhere, anytime, any crisis    Poison Control Center: 6 [phone]  [employer] Lifeline: 4 [phone]) [phone]; or [employer] Lifeline - 4 [phone]) [phone]    If you have a medical emergency, please call 911 or go to the nearest emergency department.     Phuc Redd, EMT  1:31 PM 12/14/2022    Additionally, Dr. Black asked me to reach out to the patient via [employer] to inform her that additional mental health resources are available in the out patient setting. I sent the patient a message through [employer] which inquired if she was interested in a mental health referral.     Phuc Redd, EMT  3:36 PM 12/14/2022

## 2022-12-14 NOTE — PATIENT INSTRUCTIONS
(1) Schedule a medicare Wellness visit in 4 months   (2) See me Mon 12/19 at [facility] at 10AM for a preop   (3) Get second shingrix shot at the pharmacy  (4) Tetanus today     Your health care Provider has recommended that you receive a tetanus vaccine called TD or TDAP to prevent tetanus which is an infection caused by bacteria called Clostridium tetani. Many private insurance and Medicare Part D plans cover tetanus shots. However, not all insurance carriers cover the entire cost of the tetanus vaccine if the vaccine is administered at your primary care clinic. The clinic cannot determine your insurance benefits.  Please call your insurance carrier prior. The vaccine is a single-dose immunization, typically administered every decade.     Prior to receiving the vaccine, we recommend that you call your insurance carrier and ask them the following questions:          1. Is there a cost difference if I receive the vaccine at my doctor's office or a pharmacy?        2. Does my insurance cover the TD or TDAP immunization and administration of the vaccine?        3. What is my co-pay or deductible for the vaccine?     Please call to schedule a nurse-only visit at [phone].  Nurse visits at the Primary Care Center are available Mondays, Wednesdays, and Fridays from 9:00 AM through 11:00 AM and 1:00 PM through 3:00 PM.     Thank you!    Mental Health Crisis Resources and Phone Numbers:    [facility]:  [facility]: [phone]  Crisis Residence [facility]: [phone]  Walk-In Counseling [facility]: [phone]  COPE 24/7 [place] Mobile Team: [phone]    [facility]: [phone]  Walk-in counseling [facility]: [phone]  Walk-in counseling [facility]: [phone]  Crisis Residence [facility] Residence: [phone]  Urgent Care Adult Mental Health:    --Drop-in, 24/7 crisis line, and [facility] Mobile Team [phone]    24/7 CRISIS TEXT LINE: www.crisistextline.org OR text 806-898 anywhere, anytime, any crisis    Poison Control Center: 0 [phone]  [employer] Lifeline: 8 [phone]) 918-0950; or [employer] Lifeline - 5 [phone]) 356-9635    If you have a medical emergency, please call 911 or go to the nearest emergency department.

## 2022-12-16 NOTE — PROGRESS NOTES
[facility] WOMEN'S [facility] PROFESSIONAL BLDG  3RD FLR,[address]  [address]  [facility] [address]  [facility] [address]  Phone: [phone]  Fax: [phone]  Primary Provider: Thania Ulloa  Pre-op Performing Provider: ROSETTA HELM      PREOPERATIVE EVALUATION:  Today's date: 12/19/2022    Yuko Blanchard is a 67 year old female who presents for a preoperative evaluation.    Surgical Information:  Surgery/Procedure: cataract surgery  1/2 for left eye and 1/9 for right eye    Surgery Location: [facility]  Surgeon: Ap Hernandez MD  Surgery Date: 1/2 and 1/9  Time of Surgery: AM  Where patient plans to recover: Other: lives in [place] and friend will be with her   Fax number for surgical facility: Note does not need to be faxed, will be available electronically in Epic.    Type of Anesthesia Anticipated: to be determined    Assessment & Plan     The proposed surgical procedure is considered LOW risk.    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, macular edema presence unspecified, unspecified whether long term insulin use (H)  Good control - on oral medications  - glipiZIDE (GLUCOTROL XL) 5 MG 24 hr tablet; Take 1 tablet (5 mg) by mouth daily    Preoperative examination  Low risk surgery - minimal CV and respiratory risk     Age-related cataract of both eyes, unspecified age-related cataract type  Scheduled              Risks and Recommendations:  The patient has the following additional risks and recommendations for perioperative complications:   - No identified additional risk factors other than previously addressed    Medication Instructions:  Patient is to hold her morning glipizide and take only 500mg of metformin in the morning and resume her evening metformin as scheduled.      RECOMMENDATION:  APPROVAL GIVEN to proceed with proposed procedure, without further diagnostic evaluation.      HPI related to upcoming procedure: pt having more  difficulty seeing - blurred vision, glare.  Has mature cataracts. She has macular degeneration of the retina  and getting shots (Dr Junior).  She also has proliferative retinopathy of the right eye associated with diabetes.       Health Care Directive:  Patient does not have a Health Care Directive or Living Will: Discussed advance care planning with patient; information given to patient to review.    Preoperative Review of :   reviewed - no record of controlled substances prescribed.      Status of Chronic Conditions:  DEPRESSION - Patient has a long history of Depression of mild severity not requiring medication for control. Recent PHQ9=5     DIABETES - Patient has a 3 yr history of DiabetesType Type II . Patient is being treated with diet and oral agents and denies significant side effects. Control has been good. Complicating factors include but are not limited to:  retinopathy. Last A1C was 6 10/2022    HYPERLIPIDEMIA - Patient has  Mild hyperlipidemia - no meds - following diet     Review of Systems  Constitutional, neuro, ENT, endocrine, pulmonary, cardiac, gastrointestinal, genitourinary, musculoskeletal, integument and psychiatric systems are negative, except as otherwise noted.    Patient Active Problem List    Diagnosis Date Noted     Combined form of age-related cataract, both eyes 10/26/2022     Priority: Medium     Added automatically from request for surgery [id number]        Past Medical History:   Diagnosis Date     Diabetes (H) type 2      History of colonic polyps      Macular degeneration (senile) of retina      Proliferative retinopathy of right eye      Past Surgical History:   Procedure Laterality Date     COLONOSCOPY      hx of polyps     COLONOSCOPY N/A 10/13/2022    Procedure: COLONOSCOPY, WITH POLYPECTOMY;  Surgeon: Dahlia Holley MD;  Location: [facility] OR     HAND SURGERY Left     lost few fingertips     wisdom teeth       Current Outpatient Medications   Medication Sig Dispense Refill  "    glipiZIDE (GLUCOTROL XL) 5 MG 24 hr tablet Take 1 tablet (5 mg) by mouth daily 90 tablet 3     ketotifen (ZADITOR) 0.025 % ophthalmic solution 1 drop 2 times daily       metFORMIN (GLUCOPHAGE) 500 MG tablet Take 2 tablets (1,000 mg) by mouth 2 times daily (with meals) 360 tablet 3     ONETOUCH ULTRA test strip          No Known Allergies     Social History     Tobacco Use     Smoking status: Former     Types: Cigarettes     Start date:      Quit date:      Years since quittin.9     Smokeless tobacco: Never   Substance Use Topics     Alcohol use: Not on file     Family History   Problem Relation Age of Onset     Macular Degeneration Mother      Macular Degeneration Maternal Grandmother      Macular Degeneration Other      Macular Degeneration Other      Glaucoma No family hx of      History   Drug Use Not on file         Objective     /76 (BP Location: Right arm, Patient Position: Sitting, Cuff Size: Adult Regular)   Pulse 79   Resp 18   Ht 1.645 m (5' 4.75\")   Wt 72.2 kg (159 lb 3.2 oz)   BMI 26.70 kg/m      Physical Exam    GENERAL APPEARANCE: healthy, alert and no distress     EYES: EOMI, PERRL     HENT: ear canals and TM's normal and nose and mouth without ulcers or lesions     NECK: no adenopathy, no asymmetry, masses, or scars and thyroid normal to palpation     RESP: lungs clear to auscultation - no rales, rhonchi or wheezes     CV: regular rates and rhythm, normal S1 S2, no S3 or S4 and no murmur, click or rub     ABDOMEN:  soft, nontender, no HSM or masses and bowel sounds normal     MS: extremities normal- no gross deformities noted, no evidence of inflammation in joints, FROM in all extremities.     SKIN: no suspicious lesions or rashes     NEURO: Normal strength and tone, sensory exam grossly normal, mentation intact and speech normal     PSYCH: mentation appears normal. and affect normal/bright     LYMPHATICS: No cervical adenopathy    Recent Labs   Lab Test 10/20/22  0906 "      POTASSIUM 4.4   CR 0.61   A1C 6.0*        Diagnostics:  No labs were ordered during this visit.   No EKG required for low risk surgery (cataract, skin procedure, breast biopsy, etc).    Revised Cardiac Risk Index (RCRI):  The patient has the following serious cardiovascular risks for perioperative complications:   - No serious cardiac risks = 0 points     RCRI Interpretation: 0 points: Class I (very low risk - 0.4% complication rate)           Signed Electronically by: Libby Black MD PhD  Copy of this evaluation report is provided to requesting physician.    Time note (e4, 30'): The total time (on the date of service) for this service was 30 minutes, including discussion/face-to-face, chart review, interpretation not otherwise reported, documentation, and updating of the computerized record.

## 2022-12-16 NOTE — H&P (VIEW-ONLY)
[facility] PROFESSIONAL BLDG  3RD FLR,LAKESHIA [address]  [address]  [facility] [address]  [facility] [address]  Phone: [phone]  Fax: [phone]  Primary Provider: Thania Ulloa  Pre-op Performing Provider: ROSETTA HELM      PREOPERATIVE EVALUATION:  Today's date: 12/19/2022    Yuko Blanchard is a 67 year old female who presents for a preoperative evaluation.    Surgical Information:  Surgery/Procedure: cataract surgery  1/2 for left eye and 1/9 for right eye    Sur[place] Location: [facility] [place]  Surgeon: Ap Hernandez MD  Surgery Date: 1/2 and 1/9  Time of Surgery: AM  Where patient plans to recover: Other: lives in [facility] and friend will be with her   Fax number for surgical facility: Note does not need to be faxed, will be available electronically in Epic.    Type of Anesthesia Anticipated: to be determined    Assessment & Plan     The proposed surgical procedure is considered LOW risk.    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, macular edema presence unspecified, unspecified whether long term insulin use (H)  Good control - on oral medications  - glipiZIDE (GLUCOTROL XL) 5 MG 24 hr tablet; Take 1 tablet (5 mg) by mouth daily    Preoperative examination  Low risk surgery - minimal CV and respiratory risk     Age-related cataract of both eyes, unspecified age-related cataract type  Scheduled              Risks and Recommendations:  The patient has the following additional risks and recommendations for perioperative complications:   - No identified additional risk factors other than previously addressed    Medication Instructions:  Patient is to hold her morning glipizide and take only 500mg of metformin in the morning and resume her evening metformin as scheduled.      RECOMMENDATION:  APPROVAL GIVEN to proceed with proposed procedure, without further diagnostic evaluation.      HPI related to upcoming procedure: pt having more  difficulty seeing - blurred vision, glare.  Has mature cataracts. She has macular degeneration of the retina  and getting shots (Dr Junior).  She also has proliferative retinopathy of the right eye associated with diabetes.       Health Care Directive:  Patient does not have a Health Care Directive or Living Will: Discussed advance care planning with patient; information given to patient to review.    Preoperative Review of :   reviewed - no record of controlled substances prescribed.      Status of Chronic Conditions:  DEPRESSION - Patient has a long history of Depression of mild severity not requiring medication for control. Recent PHQ9=5     DIABETES - Patient has a 3 yr history of DiabetesType Type II . Patient is being treated with diet and oral agents and denies significant side effects. Control has been good. Complicating factors include but are not limited to:  retinopathy. Last A1C was 6 10/2022    HYPERLIPIDEMIA - Patient has  Mild hyperlipidemia - no meds - following diet     Review of Systems  Constitutional, neuro, ENT, endocrine, pulmonary, cardiac, gastrointestinal, genitourinary, musculoskeletal, integument and psychiatric systems are negative, except as otherwise noted.    Patient Active Problem List    Diagnosis Date Noted     Combined form of age-related cataract, both eyes 10/26/2022     Priority: Medium     Added automatically from request for surgery [id number]        Past Medical History:   Diagnosis Date     Diabetes (H) type 2      History of colonic polyps      Macular degeneration (senile) of retina      Proliferative retinopathy of right eye      Past Surgical History:   Procedure Laterality Date     COLONOSCOPY      hx of polyps     COLONOSCOPY N/A 10/13/2022    Procedure: COLONOSCOPY, WITH POLYPECTOMY;  Surgeon: Dahlia Holley MD;  Location: [facility] OR     HAND SURGERY Left     lost few fingertips     wisdom teeth       Current Outpatient Medications   Medication Sig Dispense Refill  "    glipiZIDE (GLUCOTROL XL) 5 MG 24 hr tablet Take 1 tablet (5 mg) by mouth daily 90 tablet 3     ketotifen (ZADITOR) 0.025 % ophthalmic solution 1 drop 2 times daily       metFORMIN (GLUCOPHAGE) 500 MG tablet Take 2 tablets (1,000 mg) by mouth 2 times daily (with meals) 360 tablet 3     ONETOUCH ULTRA test strip          No Known Allergies     Social History     Tobacco Use     Smoking status: Former     Types: Cigarettes     Start date:      Quit date:      Years since quittin.9     Smokeless tobacco: Never   Substance Use Topics     Alcohol use: Not on file     Family History   Problem Relation Age of Onset     Macular Degeneration Mother      Macular Degeneration Maternal Grandmother      Macular Degeneration Other      Macular Degeneration Other      Glaucoma No family hx of      History   Drug Use Not on file         Objective     /76 (BP Location: Right arm, Patient Position: Sitting, Cuff Size: Adult Regular)   Pulse 79   Resp 18   Ht 1.645 m (5' 4.75\")   Wt 72.2 kg (159 lb 3.2 oz)   BMI 26.70 kg/m      Physical Exam    GENERAL APPEARANCE: healthy, alert and no distress     EYES: EOMI, PERRL     HENT: ear canals and TM's normal and nose and mouth without ulcers or lesions     NECK: no adenopathy, no asymmetry, masses, or scars and thyroid normal to palpation     RESP: lungs clear to auscultation - no rales, rhonchi or wheezes     CV: regular rates and rhythm, normal S1 S2, no S3 or S4 and no murmur, click or rub     ABDOMEN:  soft, nontender, no HSM or masses and bowel sounds normal     MS: extremities normal- no gross deformities noted, no evidence of inflammation in joints, FROM in all extremities.     SKIN: no suspicious lesions or rashes     NEURO: Normal strength and tone, sensory exam grossly normal, mentation intact and speech normal     PSYCH: mentation appears normal. and affect normal/bright     LYMPHATICS: No cervical adenopathy    Recent Labs   Lab Test 10/20/22  0906 "      POTASSIUM 4.4   CR 0.61   A1C 6.0*        Diagnostics:  No labs were ordered during this visit.   No EKG required for low risk surgery (cataract, skin procedure, breast biopsy, etc).    Revised Cardiac Risk Index (RCRI):  The patient has the following serious cardiovascular risks for perioperative complications:   - No serious cardiac risks = 0 points     RCRI Interpretation: 0 points: Class I (very low risk - 0.4% complication rate)           Signed Electronically by: Libby Black MD PhD  Copy of this evaluation report is provided to requesting physician.    Time note (e4, 30'): The total time (on the date of service) for this service was 30 minutes, including discussion/face-to-face, chart review, interpretation not otherwise reported, documentation, and updating of the computerized record.

## 2022-12-19 ENCOUNTER — OFFICE VISIT (OUTPATIENT)
Dept: FAMILY MEDICINE | Facility: CLINIC | Age: 67
End: 2022-12-19
Attending: FAMILY MEDICINE
Payer: COMMERCIAL

## 2022-12-19 VITALS
HEIGHT: 65 IN | RESPIRATION RATE: 18 BRPM | DIASTOLIC BLOOD PRESSURE: 76 MMHG | WEIGHT: 159.2 LBS | HEART RATE: 79 BPM | BODY MASS INDEX: 26.52 KG/M2 | SYSTOLIC BLOOD PRESSURE: 126 MMHG

## 2022-12-19 DIAGNOSIS — H25.9 AGE-RELATED CATARACT OF BOTH EYES, UNSPECIFIED AGE-RELATED CATARACT TYPE: ICD-10-CM

## 2022-12-19 DIAGNOSIS — E11.3291: ICD-10-CM

## 2022-12-19 DIAGNOSIS — Z01.818 PREOPERATIVE EXAMINATION: Primary | ICD-10-CM

## 2022-12-19 PROCEDURE — G0463 HOSPITAL OUTPT CLINIC VISIT: HCPCS | Performed by: FAMILY MEDICINE

## 2022-12-19 PROCEDURE — 99214 OFFICE O/P EST MOD 30 MIN: CPT | Performed by: FAMILY MEDICINE

## 2022-12-19 RX ORDER — GLIPIZIDE 5 MG/1
5 TABLET, FILM COATED, EXTENDED RELEASE ORAL DAILY
Qty: 90 TABLET | Refills: 3 | Status: SHIPPED | OUTPATIENT
Start: 2022-12-19 | End: 2023-11-30

## 2022-12-19 NOTE — NURSING NOTE
Chief Complaint   Patient presents with     Pre-Op Exam     Cataracts surgery Left eye 01.02.2023 and right eye will be 01.09.2023. [facility] PROFESSIONAL BLDG  3RD FLR,LAKESHIA [address]  [address] [address]  Phone: [phone]  Fax: [phone]  Primary Provider: Thania Ulloa  Pre-op Performing Provider: ROSETTA HELM

## 2022-12-19 NOTE — NURSING NOTE
Chief Complaint   Patient presents with     Pre-Op Exam     Cataracts surgery Left eye 01.02.2023 and right eye will be 01.09.2023. [facility]

## 2022-12-29 ENCOUNTER — ANESTHESIA EVENT (OUTPATIENT)
Dept: SURGERY | Facility: AMBULATORY SURGERY CENTER | Age: 67
End: 2022-12-29
Payer: COMMERCIAL

## 2023-01-02 ENCOUNTER — ANESTHESIA (OUTPATIENT)
Dept: SURGERY | Facility: AMBULATORY SURGERY CENTER | Age: 68
End: 2023-01-02
Payer: COMMERCIAL

## 2023-01-02 ENCOUNTER — OFFICE VISIT (OUTPATIENT)
Dept: OPHTHALMOLOGY | Facility: CLINIC | Age: 68
End: 2023-01-02

## 2023-01-02 ENCOUNTER — HOSPITAL ENCOUNTER (OUTPATIENT)
Facility: AMBULATORY SURGERY CENTER | Age: 68
Discharge: HOME | End: 2023-01-02
Attending: OPHTHALMOLOGY
Payer: COMMERCIAL

## 2023-01-02 VITALS
HEART RATE: 64 BPM | OXYGEN SATURATION: 98 % | HEIGHT: 65 IN | SYSTOLIC BLOOD PRESSURE: 127 MMHG | WEIGHT: 158 LBS | BODY MASS INDEX: 26.33 KG/M2 | RESPIRATION RATE: 16 BRPM | DIASTOLIC BLOOD PRESSURE: 78 MMHG | TEMPERATURE: 97.5 F

## 2023-01-02 DIAGNOSIS — Z96.1 PSEUDOPHAKIA, LEFT EYE: Primary | ICD-10-CM

## 2023-01-02 DIAGNOSIS — Z98.890 POSTOPERATIVE EYE STATE: ICD-10-CM

## 2023-01-02 DIAGNOSIS — H25.813 COMBINED FORM OF AGE-RELATED CATARACT, BOTH EYES: ICD-10-CM

## 2023-01-02 LAB — GLUCOSE BLDC GLUCOMTR-MCNC: 134 MG/DL (ref 70–99)

## 2023-01-02 PROCEDURE — C9257 BEVACIZUMAB INJECTION: HCPCS

## 2023-01-02 PROCEDURE — 66984 XCAPSL CTRC RMVL W/O ECP: CPT | Mod: LT

## 2023-01-02 PROCEDURE — 67028 INJECTION EYE DRUG: CPT | Mod: 59 | Performed by: OPHTHALMOLOGY

## 2023-01-02 PROCEDURE — 82962 GLUCOSE BLOOD TEST: CPT | Performed by: PATHOLOGY

## 2023-01-02 PROCEDURE — 67028 INJECTION EYE DRUG: CPT | Mod: LT

## 2023-01-02 PROCEDURE — 99024 POSTOP FOLLOW-UP VISIT: CPT | Mod: GC | Performed by: OPHTHALMOLOGY

## 2023-01-02 PROCEDURE — 66984 XCAPSL CTRC RMVL W/O ECP: CPT | Mod: LT | Performed by: OPHTHALMOLOGY

## 2023-01-02 DEVICE — IMPLANTABLE DEVICE: Type: IMPLANTABLE DEVICE | Site: EYE | Status: FUNCTIONAL

## 2023-01-02 RX ORDER — OXYCODONE HYDROCHLORIDE 5 MG/1
10 TABLET ORAL EVERY 4 HOURS PRN
Status: DISCONTINUED | OUTPATIENT
Start: 2023-01-02 | End: 2023-01-03 | Stop reason: HOSPADM

## 2023-01-02 RX ORDER — TETRACAINE HYDROCHLORIDE 5 MG/ML
SOLUTION OPHTHALMIC DAILY PRN
Status: DISCONTINUED | OUTPATIENT
Start: 2023-01-02 | End: 2023-01-02 | Stop reason: HOSPADM

## 2023-01-02 RX ORDER — FENTANYL CITRATE 50 UG/ML
25 INJECTION, SOLUTION INTRAMUSCULAR; INTRAVENOUS
Status: DISCONTINUED | OUTPATIENT
Start: 2023-01-02 | End: 2023-01-03 | Stop reason: HOSPADM

## 2023-01-02 RX ORDER — CYCLOPENTOLAT/TROPIC/PHENYLEPH 1%-1%-2.5%
1 DROPS (EA) OPHTHALMIC (EYE)
Status: COMPLETED | OUTPATIENT
Start: 2023-01-02 | End: 2023-01-02

## 2023-01-02 RX ORDER — SODIUM CHLORIDE, SODIUM LACTATE, POTASSIUM CHLORIDE, CALCIUM CHLORIDE 600; 310; 30; 20 MG/100ML; MG/100ML; MG/100ML; MG/100ML
INJECTION, SOLUTION INTRAVENOUS CONTINUOUS
Status: DISCONTINUED | OUTPATIENT
Start: 2023-01-02 | End: 2023-01-03 | Stop reason: HOSPADM

## 2023-01-02 RX ORDER — FENTANYL CITRATE 50 UG/ML
50 INJECTION, SOLUTION INTRAMUSCULAR; INTRAVENOUS EVERY 5 MIN PRN
Status: DISCONTINUED | OUTPATIENT
Start: 2023-01-02 | End: 2023-01-02 | Stop reason: HOSPADM

## 2023-01-02 RX ORDER — HYDROMORPHONE HYDROCHLORIDE 1 MG/ML
0.4 INJECTION, SOLUTION INTRAMUSCULAR; INTRAVENOUS; SUBCUTANEOUS EVERY 5 MIN PRN
Status: DISCONTINUED | OUTPATIENT
Start: 2023-01-02 | End: 2023-01-02 | Stop reason: HOSPADM

## 2023-01-02 RX ORDER — KETOROLAC TROMETHAMINE 4 MG/ML
1 SOLUTION/ DROPS OPHTHALMIC 4 TIMES DAILY
Qty: 5 ML | Refills: 1 | Status: SHIPPED | OUTPATIENT
Start: 2023-01-02 | End: 2023-02-21

## 2023-01-02 RX ORDER — PROPARACAINE HYDROCHLORIDE 5 MG/ML
1 SOLUTION/ DROPS OPHTHALMIC ONCE
Status: COMPLETED | OUTPATIENT
Start: 2023-01-02 | End: 2023-01-02

## 2023-01-02 RX ORDER — SODIUM CHLORIDE, SODIUM LACTATE, POTASSIUM CHLORIDE, CALCIUM CHLORIDE 600; 310; 30; 20 MG/100ML; MG/100ML; MG/100ML; MG/100ML
INJECTION, SOLUTION INTRAVENOUS CONTINUOUS
Status: DISCONTINUED | OUTPATIENT
Start: 2023-01-02 | End: 2023-01-02 | Stop reason: HOSPADM

## 2023-01-02 RX ORDER — MOXIFLOXACIN IN NACL,ISO-OS/PF 0.3MG/0.3
SYRINGE (ML) INTRAOCULAR DAILY PRN
Status: DISCONTINUED | OUTPATIENT
Start: 2023-01-02 | End: 2023-01-02 | Stop reason: HOSPADM

## 2023-01-02 RX ORDER — LIDOCAINE 40 MG/G
CREAM TOPICAL
Status: DISCONTINUED | OUTPATIENT
Start: 2023-01-02 | End: 2023-01-02 | Stop reason: HOSPADM

## 2023-01-02 RX ORDER — FENTANYL CITRATE 50 UG/ML
25 INJECTION, SOLUTION INTRAMUSCULAR; INTRAVENOUS EVERY 5 MIN PRN
Status: DISCONTINUED | OUTPATIENT
Start: 2023-01-02 | End: 2023-01-02 | Stop reason: HOSPADM

## 2023-01-02 RX ORDER — HYDROMORPHONE HYDROCHLORIDE 1 MG/ML
0.2 INJECTION, SOLUTION INTRAMUSCULAR; INTRAVENOUS; SUBCUTANEOUS EVERY 5 MIN PRN
Status: DISCONTINUED | OUTPATIENT
Start: 2023-01-02 | End: 2023-01-02 | Stop reason: HOSPADM

## 2023-01-02 RX ORDER — DEXAMETHASONE SODIUM PHOSPHATE 4 MG/ML
INJECTION, SOLUTION INTRA-ARTICULAR; INTRALESIONAL; INTRAMUSCULAR; INTRAVENOUS; SOFT TISSUE DAILY PRN
Status: DISCONTINUED | OUTPATIENT
Start: 2023-01-02 | End: 2023-01-02 | Stop reason: HOSPADM

## 2023-01-02 RX ORDER — OXYCODONE HYDROCHLORIDE 5 MG/1
5 TABLET ORAL EVERY 4 HOURS PRN
Status: DISCONTINUED | OUTPATIENT
Start: 2023-01-02 | End: 2023-01-03 | Stop reason: HOSPADM

## 2023-01-02 RX ORDER — FENTANYL CITRATE 50 UG/ML
INJECTION, SOLUTION INTRAMUSCULAR; INTRAVENOUS PRN
Status: DISCONTINUED | OUTPATIENT
Start: 2023-01-02 | End: 2023-01-02

## 2023-01-02 RX ORDER — MEPERIDINE HYDROCHLORIDE 25 MG/ML
12.5 INJECTION INTRAMUSCULAR; INTRAVENOUS; SUBCUTANEOUS
Status: DISCONTINUED | OUTPATIENT
Start: 2023-01-02 | End: 2023-01-03 | Stop reason: HOSPADM

## 2023-01-02 RX ORDER — MOXIFLOXACIN 5 MG/ML
1 SOLUTION/ DROPS OPHTHALMIC 3 TIMES DAILY
Qty: 3 ML | Refills: 1 | Status: SHIPPED | OUTPATIENT
Start: 2023-01-02 | End: 2023-02-21

## 2023-01-02 RX ORDER — TIMOLOL MALEATE 5 MG/ML
SOLUTION/ DROPS OPHTHALMIC DAILY PRN
Status: DISCONTINUED | OUTPATIENT
Start: 2023-01-02 | End: 2023-01-02 | Stop reason: HOSPADM

## 2023-01-02 RX ORDER — ACETAMINOPHEN 325 MG/1
975 TABLET ORAL ONCE
Status: COMPLETED | OUTPATIENT
Start: 2023-01-02 | End: 2023-01-02

## 2023-01-02 RX ORDER — ONDANSETRON 4 MG/1
4 TABLET, ORALLY DISINTEGRATING ORAL EVERY 30 MIN PRN
Status: DISCONTINUED | OUTPATIENT
Start: 2023-01-02 | End: 2023-01-03 | Stop reason: HOSPADM

## 2023-01-02 RX ORDER — BALANCED SALT SOLUTION 6.4; .75; .48; .3; 3.9; 1.7 MG/ML; MG/ML; MG/ML; MG/ML; MG/ML; MG/ML
SOLUTION OPHTHALMIC DAILY PRN
Status: DISCONTINUED | OUTPATIENT
Start: 2023-01-02 | End: 2023-01-02 | Stop reason: HOSPADM

## 2023-01-02 RX ORDER — PREDNISOLONE ACETATE 10 MG/ML
1 SUSPENSION/ DROPS OPHTHALMIC 4 TIMES DAILY
Qty: 10 ML | Refills: 1 | Status: SHIPPED | OUTPATIENT
Start: 2023-01-02 | End: 2023-02-21

## 2023-01-02 RX ORDER — ONDANSETRON 2 MG/ML
4 INJECTION INTRAMUSCULAR; INTRAVENOUS EVERY 30 MIN PRN
Status: DISCONTINUED | OUTPATIENT
Start: 2023-01-02 | End: 2023-01-03 | Stop reason: HOSPADM

## 2023-01-02 RX ADMIN — Medication 1 DROP: at 08:50

## 2023-01-02 RX ADMIN — PROPARACAINE HYDROCHLORIDE 1 DROP: 5 SOLUTION/ DROPS OPHTHALMIC at 08:48

## 2023-01-02 RX ADMIN — Medication 1 DROP: at 08:58

## 2023-01-02 RX ADMIN — Medication 1 DROP: at 09:06

## 2023-01-02 RX ADMIN — FENTANYL CITRATE 50 MCG: 50 INJECTION, SOLUTION INTRAMUSCULAR; INTRAVENOUS at 09:50

## 2023-01-02 RX ADMIN — SODIUM CHLORIDE, SODIUM LACTATE, POTASSIUM CHLORIDE, CALCIUM CHLORIDE: 600; 310; 30; 20 INJECTION, SOLUTION INTRAVENOUS at 09:06

## 2023-01-02 ASSESSMENT — TONOMETRY
IOP_METHOD: TONOPEN
OS_IOP_MMHG: 20

## 2023-01-02 ASSESSMENT — LIFESTYLE VARIABLES: TOBACCO_USE: 1

## 2023-01-02 ASSESSMENT — SLIT LAMP EXAM - LIDS: COMMENTS: NORMAL

## 2023-01-02 ASSESSMENT — VISUAL ACUITY
METHOD: SNELLEN - LINEAR
OS_SC: 20/200

## 2023-01-02 ASSESSMENT — EXTERNAL EXAM - LEFT EYE: OS_EXAM: NORMAL

## 2023-01-02 NOTE — ANESTHESIA CARE TRANSFER NOTE
Patient: Yuko Blanchard    Procedure: Procedure(s):  PHACOEMULSIFICATION, CATARACT, WITH STANDARD INTRAOCULAR LENS IMPLANT INSERTION-LEFT EYE       Diagnosis: Combined form of age-related cataract, both eyes [H25.813]  Diagnosis Additional Information: No value filed.    Anesthesia Type:   MAC     Note:    Oropharynx: oropharynx clear of all foreign objects and spontaneously breathing  Level of Consciousness: awake  Oxygen Supplementation: room air    Independent Airway: airway patency satisfactory and stable  Dentition: dentition unchanged  Vital Signs Stable: post-procedure vital signs reviewed and stable  Report to RN Given: handoff report given  Patient transferred to: Phase II    Handoff Report: Identifed the Patient, Identified the Reponsible Provider, Reviewed the pertinent medical history, Discussed the surgical course, Reviewed Intra-OP anesthesia mangement and issues during anesthesia, Set expectations for post-procedure period and Allowed opportunity for questions and acknowledgement of understanding      Vitals:  Vitals Value Taken Time   /78 01/02/23 1023   Temp 36.4  C (97.5  F) 01/02/23 1023   Pulse 64 01/02/23 1023   Resp 16 01/02/23 1023   SpO2 99 % 01/02/23 1023       Electronically Signed By: KIRAN Barillas CRNA  January 2, 2023  10:25 AM

## 2023-01-02 NOTE — PROGRESS NOTES
POD#0, status post cataract surgery and intravitreal avastin, left eye     Impression/Plan:  Pseudophakia, left eye: POD0, good post-operative appearance. IOP reasonable.     Eye protection at all times and eye shield at night for 1 week.     Limited activities with no exercise or heavy lifting for 1 week.     Instructed patient to contact us for decreasing vision, eye pain, new floaters or flashes of light or other concerning symptoms.     Written instructions given    Drops:  Moxifloxacin TID for 7 days in operative eye  Prednisolone 4/3/2/1 taper every 7 days in operative eye  Ketorolac QID for 14 days in operative eye    Follow up scheduled for CEIOL right eye 1/9/23.    All questions were answered    Shaista Durán MD  Ophthalmology Resident, PGY-4  [facility]     Attending Physician Attestation:  Complete documentation of historical and exam elements from today's encounter can be found in the full encounter summary report (not reduplicated in this progress note).  I personally obtained the chief complaint(s) and history of present illness.  I confirmed and edited as necessary the review of systems, past medical/surgical history, family history, social history, and examination findings as documented by others; and I examined the patient myself.  I personally reviewed the relevant tests, images, and reports as documented above.  I formulated and edited as necessary the assessment and plan and discussed the findings and management plan with the patient and family. . - Ap Hernandez MD

## 2023-01-02 NOTE — ANESTHESIA POSTPROCEDURE EVALUATION
Patient: Yuko Blanchard    Procedure: Procedure(s):  PHACOEMULSIFICATION, CATARACT, WITH STANDARD INTRAOCULAR LENS IMPLANT INSERTION-LEFT EYE       Anesthesia Type:  MAC    Note:  Disposition: Outpatient   Postop Pain Control: Uneventful            Sign Out: Well controlled pain   PONV: No   Neuro/Psych: Uneventful            Sign Out: Acceptable/Baseline neuro status   Airway/Respiratory: Uneventful            Sign Out: Acceptable/Baseline resp. status   CV/Hemodynamics: Uneventful            Sign Out: Acceptable CV status; No obvious hypovolemia; No obvious fluid overload   Other NRE: NONE   DID A NON-ROUTINE EVENT OCCUR? No           Last vitals:  Vitals Value Taken Time   /78 01/02/23 1041   Temp 36.4  C (97.5  F) 01/02/23 1041   Pulse 64 01/02/23 1041   Resp 16 01/02/23 1041   SpO2 98 % 01/02/23 1041       Electronically Signed By: Derek Lewis MD  January 2, 2023  12:01 PM

## 2023-01-02 NOTE — ANESTHESIA PREPROCEDURE EVALUATION
Anesthesia Pre-Procedure Evaluation    Patient: Yuko Blanchard   MRN: [id number] : 1955        Procedure : Procedure(s):  PHACOEMULSIFICATION, CATARACT, WITH STANDARD INTRAOCULAR LENS IMPLANT INSERTION-LEFT EYE          Past Medical History:   Diagnosis Date     Diabetes (H) type 2      History of colonic polyps      Macular degeneration (senile) of retina      Proliferative retinopathy of right eye       Past Surgical History:   Procedure Laterality Date     COLONOSCOPY      hx of polyps     COLONOSCOPY N/A 10/13/2022    Procedure: COLONOSCOPY, WITH POLYPECTOMY;  Surgeon: Dahlia Holley MD;  Location: [facility] OR     HAND SURGERY Left     lost few fingertips     wisdom teeth        No Known Allergies   Social History     Tobacco Use     Smoking status: Former     Types: Cigarettes     Start date:      Quit date:      Years since quittin.0     Smokeless tobacco: Never   Substance Use Topics     Alcohol use: Not on file      Wt Readings from Last 1 Encounters:   23 71.7 kg (158 lb)        Anesthesia Evaluation            ROS/MED HX  ENT/Pulmonary:     (+) tobacco use, Past use,     Neurologic:       Cardiovascular:       METS/Exercise Tolerance:     Hematologic:       Musculoskeletal:       GI/Hepatic:       Renal/Genitourinary:       Endo:     (+) type II DM,     Psychiatric/Substance Use:       Infectious Disease:       Malignancy:       Other:            Physical Exam    Airway  airway exam normal           Respiratory Devices and Support         Dental  no notable dental history         Cardiovascular   cardiovascular exam normal          Pulmonary   pulmonary exam normal                OUTSIDE LABS:  CBC: No results found for: WBC, HGB, HCT, PLT  BMP:   Lab Results   Component Value Date     10/20/2022    POTASSIUM 4.4 10/20/2022    CHLORIDE 101 10/20/2022    CO2 25 10/20/2022    BUN 9.8 10/20/2022    CR 0.61 10/20/2022     (H) 2023     (H) 10/20/2022      COAGS: No results found for: PTT, INR, FIBR  POC: No results found for: BGM, HCG, HCGS  HEPATIC: No results found for: ALBUMIN, PROTTOTAL, ALT, AST, GGT, ALKPHOS, BILITOTAL, BILIDIRECT, ASHLEY  OTHER:   Lab Results   Component Value Date    A1C 6.0 (H) 10/20/2022    ARMANDO 10.1 10/20/2022       Anesthesia Plan    ASA Status:  2   NPO Status:  NPO Appropriate    Anesthesia Type: MAC.     - Reason for MAC: straight local not clinically adequate   Induction: Intravenous.   Maintenance: TIVA.        Consents    Anesthesia Plan(s) and associated risks, benefits, and realistic alternatives discussed. Questions answered and patient/representative(s) expressed understanding.    - Discussed:     - Discussed with:  Patient      - Extended Intubation/Ventilatory Support Discussed: No.      - Patient is DNR/DNI Status: No    Use of blood products discussed: No .     Postoperative Care            Comments:                Derek Lewis MD

## 2023-01-02 NOTE — DISCHARGE INSTRUCTIONS
"[facility]  Home Care Following Anesthesia  For 24 hours after surgery:  Get plenty of rest.  A responsible adult must stay with you for at least 24 hours after you leave the surgery center.  Do not drive or use heavy equipment.  If you have weakness or tingling, don't drive or use heavy equipment until this feeling goes away.   Do not drink alcohol.   Avoid strenuous or risky activities.  Ask for help when climbing stairs.  You may feel lightheaded.  IF so, sit for a few minutes before standing.  Have someone help you get up.   If you have nausea (feel sick to your stomach): Drink only clear liquids such as apple juice, ginger ale, broth or 7-Up.  Rest may also help.  Be sure to drink enough fluids.  Move to a regular diet as you feel able.   You may have a slight fever.  Call the doctor if your fever is over 100 F (37.7 C) (taken under the tongue) or lasts longer than 24 hours.  You may have a dry mouth, a sore throat, muscle aches or trouble sleeping. These should go away after 24 hours.  Do not make important or legal decisions.   It is recommended to avoid smoking.        Today you received a Marcaine or bupivacaine block to numb the nerves near your surgery site.  This is a block using local anesthetic or \"numbing\" medication injected around the nerves to anesthetize or \"numb\" the area supplied by those nerves.  This block is injected into the muscle layer near your surgical site.  The medication may numb the location where you had surgery for 6-18 hours, but may last up to 24 hours.  If your surgical site is an arm or leg you should be careful with your affected limb, since it is possible to injure your limb without being aware of it due to the numbing.  Until full feeling returns, you should guard against bumping or hitting your limb, and avoid extreme hot or cold temperatures on the skin.  As the block wears off, the feeling will return as a tingling or prickly " sensation near your surgical site.  You will experience more discomfort from your incision as the feeling returns.  You may want to take a pain pill (a narcotic or Tylenol if this was prescribed by your surgeon) when you start to experience mild pain before the pain beccomes more severe.  If your pain medications do not control your pain you should notifiy your surgeon.    Tips for taking pain medications  To get the best pain relief possible, remember these points:  Take pain medications as directed, before pain becomes severe.  Pain medication can upset your stomach: taking it with food may help.  Constipation is a common side effect of pain medication. Drink plenty of  fluids.  Eat foods high in fiber. Take a stool softener if recommended by your doctor or pharmacist.  Do not drink alcohol, drive or operate machinery while taking pain medications.  Ask about other ways to control pain, such as with heat, ice or relaxation.    Tylenol/Acetaminophen Consumption  To help encourage the safe use of acetaminophen, the makers of TYLENOL  have lowered the maximum daily dose for single-ingredient Extra Strength TYLENOL  (acetaminophen) products sold in the [place] from 8 pills per day (4,000 mg) to 6 pills per day (3,000 mg). The dosing interval has also changed from 2 pills every 4-6 hours to 2 pills every 6 hours.  If you feel your pain relief is insufficient, you may take Tylenol/Acetaminophen in addition to your narcotic pain medication.   Be careful not to exceed 3,000 mg of Tylenol/Acetaminophen in a 24 hour period from all sources.  If you are taking extra strength Tylenol/acetaminophen (500 mg), the maximum dose is 6 tablets in 24 hours.  If you are taking regular strength acetaminophen (325 mg), the maximum dose is 9 tablets in 24 hours.    Call a doctor for any of the following:  Signs of infection (fever, growing tenderness at the surgery site, a large amount of drainage or bleeding, severe pain, foul-smelling  drainage, redness, swelling).  It has been over 8 to 10 hours since surgery and you are still not able to urinate (pass water).  Headache for over 24 hours.  Signs of Covid-19 infection (temperature over 100 degrees, shortness of breath, cough, loss of taste/smell, generalized body aches, persistent headache, chills, sore throat, nausea/vomiting/diarrhea)  Your doctor is:       Dr. Ap Hernandez, Ophthalmology: [phone]               Or dial [phone] and ask for the resident on call for:  Ophthalmology  For emergency care, call the:  [facility]:  [phone] (TTY for hearing impaired: [phone])

## 2023-01-02 NOTE — INTERVAL H&P NOTE
"I have reviewed the surgical (or preoperative) H&P that is linked to this encounter, and examined the patient. There are no significant changes    Clinical Conditions Present on Arrival:  Clinically Significant Risk Factors Present on Admission                    # Overweight: Estimated body mass index is 26.51 kg/m  as calculated from the following:    Height as of this encounter: 1.644 m (5' 4.73\").    Weight as of this encounter: 71.7 kg (158 lb).       "

## 2023-01-02 NOTE — OP NOTE
PREOPERATIVE DIAGNOSIS:   1. Combined form of age-related cataract, both eyes    2.  Choroidal neovascular membrane, Left eye   POSTOPERATIVE DIAGNOSIS: Same   PROCEDURES:   1. Cataract extraction with intraocular lens implant Left eye.  2. Intravitreal bevacizumab injection, left eye   SURGEON: Ap Hernandez M.D.  Assistant: Shaista Durán MD   INDICATIONS: The patient Yuko Blanchard presented to the eye clinic with decreased vision secondary to cataract in the Left eye. The risks, benefits and alternatives to cataract extraction were discussed. The patient elected to proceed. All questions were answered to the patient's satisfaction.   DESCRIPTION OF PROCEDURE:   Prior to the procedure, appropriate cardiac and respiratory monitors were applied to the patient.  In the pre-operative holding area, a drop of topical tetracaine followed by lidocaine gel followed by povidone iodine.  The patient was brought to the operating room where a surgical pause was carried out to identify with all members of the surgical team the correct surgical site.  With adequate anesthesia, the Left eye was prepped and draped in the usual sterile fashion. A lid speculum was placed, and the operating microscope was rotated into position. A paracentesis was created.  Through this limbal paracentesis, the anterior chamber was filled with preservative-free lidocaine followed by viscoelastic.  A temporal wound was created at the limbus using a 2.6 mm blade. A capsulorrhexis was initiated using a bent 25-gauge needle and was completed in continuous and circular fashion using the capsulorrhexis forceps. The lens nucleus was hydrodissected using balanced salt solution.  The lens nucleus was rotated and removed using phacoemulsification in a stop and chop technique.  Residual cortical material was removed using irrigation-aspiration.  The capsular bag was reinflated to its maximal extent with cohesive viscoelastic.  A 6.0 diopter DIBOO inserted  into the capsular bag.  The lens power selected was reviewed using the intraocular lens power measurements that were obtained preoperatively to confirm that the correct lens was selected for the desired post-operative refractive state. The residual viscoelastic was removed in its entirety, the wound were hydrated and found to be self-sealing.  Intracameral moxifloxacin was administered. Calipers were used to john the conjunctivae 3.5 mm posterior to the limbus superotemporally.  A TB syringe with a 30-gauge needle was used to inject 0.05 ml of Avastin into the vitreous.  Tactile pressure was confirmed to be in a normal range.  Subconjunctival Dexamethasone (2 mg) was injected.. The lid speculum was removed and a patch and shield were applied.  The patient tolerated the procedure well, and there were no complications.    PLAN: The patient will be discharged to home and will follow up tomorrow morning in the eye clinic.  EBL:  None  Complications:  None  Implant Name Type Inv. Item Serial No.  Lot No. LRB No. Used Action   EYE IMP IOL TECNIS IOL DIB00 6.0 DIB00 6.0 - [id number] Lens/Eye Implant EYE IMP IOL TECNIS IOL DIB00 6.0 DIB00 6.0 [id number] [employer]  Left 1 Implanted          Attending Physician Procedure Attestation: I was present for the entire procedure       Ap Hrenandez MD  , Comprehensive Ophthalmology  Department of Ophthalmology and Visual Neurosciences  [facility]

## 2023-01-06 ENCOUNTER — ANESTHESIA EVENT (OUTPATIENT)
Dept: SURGERY | Facility: AMBULATORY SURGERY CENTER | Age: 68
End: 2023-01-06
Payer: COMMERCIAL

## 2023-01-08 ENCOUNTER — OFFICE VISIT (OUTPATIENT)
Dept: OPHTHALMOLOGY | Facility: CLINIC | Age: 68
End: 2023-01-08
Payer: COMMERCIAL

## 2023-01-08 DIAGNOSIS — Z98.890 POSTOPERATIVE EYE STATE: Primary | ICD-10-CM

## 2023-01-08 DIAGNOSIS — Z96.1 PSEUDOPHAKIA OF BOTH EYES: ICD-10-CM

## 2023-01-08 PROCEDURE — 99207 PR SERVICE NOT STAFFED W/SUPERV PROV: CPT | Mod: GC | Performed by: OPHTHALMOLOGY

## 2023-01-09 ENCOUNTER — ANESTHESIA (OUTPATIENT)
Dept: SURGERY | Facility: AMBULATORY SURGERY CENTER | Age: 68
End: 2023-01-09
Payer: COMMERCIAL

## 2023-01-09 ENCOUNTER — HOSPITAL ENCOUNTER (OUTPATIENT)
Facility: AMBULATORY SURGERY CENTER | Age: 68
Discharge: HOME | End: 2023-01-09
Attending: OPHTHALMOLOGY
Payer: COMMERCIAL

## 2023-01-09 ENCOUNTER — OFFICE VISIT (OUTPATIENT)
Dept: OPHTHALMOLOGY | Facility: CLINIC | Age: 68
End: 2023-01-09

## 2023-01-09 VITALS
HEIGHT: 65 IN | SYSTOLIC BLOOD PRESSURE: 129 MMHG | HEART RATE: 65 BPM | WEIGHT: 158 LBS | TEMPERATURE: 97.1 F | RESPIRATION RATE: 16 BRPM | BODY MASS INDEX: 26.33 KG/M2 | OXYGEN SATURATION: 99 % | DIASTOLIC BLOOD PRESSURE: 75 MMHG

## 2023-01-09 DIAGNOSIS — Z98.890 POSTOPERATIVE EYE STATE: ICD-10-CM

## 2023-01-09 DIAGNOSIS — H25.813 COMBINED FORM OF AGE-RELATED CATARACT, BOTH EYES: Primary | ICD-10-CM

## 2023-01-09 DIAGNOSIS — Z96.1 PSEUDOPHAKIA, RIGHT EYE: Primary | ICD-10-CM

## 2023-01-09 PROCEDURE — 66984 XCAPSL CTRC RMVL W/O ECP: CPT | Mod: 79 | Performed by: OPHTHALMOLOGY

## 2023-01-09 PROCEDURE — 99024 POSTOP FOLLOW-UP VISIT: CPT | Mod: GC | Performed by: OPHTHALMOLOGY

## 2023-01-09 PROCEDURE — 66984 XCAPSL CTRC RMVL W/O ECP: CPT | Mod: RT

## 2023-01-09 DEVICE — IMPLANTABLE DEVICE: Type: IMPLANTABLE DEVICE | Site: EYE | Status: FUNCTIONAL

## 2023-01-09 RX ORDER — DEXAMETHASONE SODIUM PHOSPHATE 4 MG/ML
INJECTION, SOLUTION INTRA-ARTICULAR; INTRALESIONAL; INTRAMUSCULAR; INTRAVENOUS; SOFT TISSUE PRN
Status: DISCONTINUED | OUTPATIENT
Start: 2023-01-09 | End: 2023-01-09 | Stop reason: HOSPADM

## 2023-01-09 RX ORDER — TIMOLOL MALEATE 5 MG/ML
SOLUTION/ DROPS OPHTHALMIC PRN
Status: DISCONTINUED | OUTPATIENT
Start: 2023-01-09 | End: 2023-01-09 | Stop reason: HOSPADM

## 2023-01-09 RX ORDER — BALANCED SALT SOLUTION 6.4; .75; .48; .3; 3.9; 1.7 MG/ML; MG/ML; MG/ML; MG/ML; MG/ML; MG/ML
SOLUTION OPHTHALMIC PRN
Status: DISCONTINUED | OUTPATIENT
Start: 2023-01-09 | End: 2023-01-09 | Stop reason: HOSPADM

## 2023-01-09 RX ORDER — FENTANYL CITRATE 50 UG/ML
50 INJECTION, SOLUTION INTRAMUSCULAR; INTRAVENOUS EVERY 5 MIN PRN
Status: DISCONTINUED | OUTPATIENT
Start: 2023-01-09 | End: 2023-01-09 | Stop reason: HOSPADM

## 2023-01-09 RX ORDER — MOXIFLOXACIN 5 MG/ML
1 SOLUTION/ DROPS OPHTHALMIC 3 TIMES DAILY
Qty: 3 ML | Refills: 1 | Status: SHIPPED | OUTPATIENT
Start: 2023-01-09 | End: 2023-02-21

## 2023-01-09 RX ORDER — ONDANSETRON 2 MG/ML
4 INJECTION INTRAMUSCULAR; INTRAVENOUS EVERY 30 MIN PRN
Status: DISCONTINUED | OUTPATIENT
Start: 2023-01-09 | End: 2023-01-10 | Stop reason: HOSPADM

## 2023-01-09 RX ORDER — ONDANSETRON 4 MG/1
4 TABLET, ORALLY DISINTEGRATING ORAL EVERY 30 MIN PRN
Status: DISCONTINUED | OUTPATIENT
Start: 2023-01-09 | End: 2023-01-10 | Stop reason: HOSPADM

## 2023-01-09 RX ORDER — LIDOCAINE 40 MG/G
CREAM TOPICAL
Status: DISCONTINUED | OUTPATIENT
Start: 2023-01-09 | End: 2023-01-09 | Stop reason: HOSPADM

## 2023-01-09 RX ORDER — HYDROMORPHONE HYDROCHLORIDE 1 MG/ML
0.2 INJECTION, SOLUTION INTRAMUSCULAR; INTRAVENOUS; SUBCUTANEOUS EVERY 5 MIN PRN
Status: DISCONTINUED | OUTPATIENT
Start: 2023-01-09 | End: 2023-01-09 | Stop reason: HOSPADM

## 2023-01-09 RX ORDER — OXYCODONE HYDROCHLORIDE 5 MG/1
10 TABLET ORAL EVERY 4 HOURS PRN
Status: DISCONTINUED | OUTPATIENT
Start: 2023-01-09 | End: 2023-01-10 | Stop reason: HOSPADM

## 2023-01-09 RX ORDER — MEPERIDINE HYDROCHLORIDE 25 MG/ML
12.5 INJECTION INTRAMUSCULAR; INTRAVENOUS; SUBCUTANEOUS
Status: DISCONTINUED | OUTPATIENT
Start: 2023-01-09 | End: 2023-01-10 | Stop reason: HOSPADM

## 2023-01-09 RX ORDER — PROPARACAINE HYDROCHLORIDE 5 MG/ML
1 SOLUTION/ DROPS OPHTHALMIC ONCE
Status: DISCONTINUED | OUTPATIENT
Start: 2023-01-09 | End: 2023-01-09 | Stop reason: HOSPADM

## 2023-01-09 RX ORDER — OXYCODONE HYDROCHLORIDE 5 MG/1
5 TABLET ORAL EVERY 4 HOURS PRN
Status: DISCONTINUED | OUTPATIENT
Start: 2023-01-09 | End: 2023-01-10 | Stop reason: HOSPADM

## 2023-01-09 RX ORDER — PREDNISOLONE ACETATE 10 MG/ML
1 SUSPENSION/ DROPS OPHTHALMIC 4 TIMES DAILY
Qty: 10 ML | Refills: 1 | Status: SHIPPED | OUTPATIENT
Start: 2023-01-09 | End: 2023-02-21

## 2023-01-09 RX ORDER — FENTANYL CITRATE 50 UG/ML
25 INJECTION, SOLUTION INTRAMUSCULAR; INTRAVENOUS
Status: DISCONTINUED | OUTPATIENT
Start: 2023-01-09 | End: 2023-01-10 | Stop reason: HOSPADM

## 2023-01-09 RX ORDER — KETOROLAC TROMETHAMINE 4 MG/ML
1 SOLUTION/ DROPS OPHTHALMIC 4 TIMES DAILY
Qty: 5 ML | Refills: 1 | Status: SHIPPED | OUTPATIENT
Start: 2023-01-09 | End: 2023-02-21

## 2023-01-09 RX ORDER — ACETAMINOPHEN 325 MG/1
975 TABLET ORAL ONCE
Status: DISCONTINUED | OUTPATIENT
Start: 2023-01-09 | End: 2023-01-09 | Stop reason: HOSPADM

## 2023-01-09 RX ORDER — CYCLOPENTOLAT/TROPIC/PHENYLEPH 1%-1%-2.5%
1 DROPS (EA) OPHTHALMIC (EYE)
Status: DISCONTINUED | OUTPATIENT
Start: 2023-01-09 | End: 2023-01-09 | Stop reason: HOSPADM

## 2023-01-09 RX ORDER — SODIUM CHLORIDE, SODIUM LACTATE, POTASSIUM CHLORIDE, CALCIUM CHLORIDE 600; 310; 30; 20 MG/100ML; MG/100ML; MG/100ML; MG/100ML
INJECTION, SOLUTION INTRAVENOUS CONTINUOUS
Status: DISCONTINUED | OUTPATIENT
Start: 2023-01-09 | End: 2023-01-10 | Stop reason: HOSPADM

## 2023-01-09 RX ORDER — FENTANYL CITRATE 50 UG/ML
INJECTION, SOLUTION INTRAMUSCULAR; INTRAVENOUS PRN
Status: DISCONTINUED | OUTPATIENT
Start: 2023-01-09 | End: 2023-01-09

## 2023-01-09 RX ORDER — SODIUM CHLORIDE, SODIUM LACTATE, POTASSIUM CHLORIDE, CALCIUM CHLORIDE 600; 310; 30; 20 MG/100ML; MG/100ML; MG/100ML; MG/100ML
INJECTION, SOLUTION INTRAVENOUS CONTINUOUS
Status: DISCONTINUED | OUTPATIENT
Start: 2023-01-09 | End: 2023-01-09 | Stop reason: HOSPADM

## 2023-01-09 RX ORDER — LIDOCAINE HYDROCHLORIDE 10 MG/ML
INJECTION, SOLUTION EPIDURAL; INFILTRATION; INTRACAUDAL; PERINEURAL PRN
Status: DISCONTINUED | OUTPATIENT
Start: 2023-01-09 | End: 2023-01-09 | Stop reason: HOSPADM

## 2023-01-09 RX ORDER — MOXIFLOXACIN IN NACL,ISO-OS/PF 0.3MG/0.3
SYRINGE (ML) INTRAOCULAR PRN
Status: DISCONTINUED | OUTPATIENT
Start: 2023-01-09 | End: 2023-01-09 | Stop reason: HOSPADM

## 2023-01-09 RX ORDER — FENTANYL CITRATE 50 UG/ML
25 INJECTION, SOLUTION INTRAMUSCULAR; INTRAVENOUS EVERY 5 MIN PRN
Status: DISCONTINUED | OUTPATIENT
Start: 2023-01-09 | End: 2023-01-09 | Stop reason: HOSPADM

## 2023-01-09 RX ORDER — HYDROMORPHONE HYDROCHLORIDE 1 MG/ML
0.4 INJECTION, SOLUTION INTRAMUSCULAR; INTRAVENOUS; SUBCUTANEOUS EVERY 5 MIN PRN
Status: DISCONTINUED | OUTPATIENT
Start: 2023-01-09 | End: 2023-01-09 | Stop reason: HOSPADM

## 2023-01-09 RX ORDER — TETRACAINE HYDROCHLORIDE 5 MG/ML
SOLUTION OPHTHALMIC PRN
Status: DISCONTINUED | OUTPATIENT
Start: 2023-01-09 | End: 2023-01-09 | Stop reason: HOSPADM

## 2023-01-09 RX ADMIN — FENTANYL CITRATE 50 MCG: 50 INJECTION, SOLUTION INTRAMUSCULAR; INTRAVENOUS at 11:26

## 2023-01-09 RX ADMIN — SODIUM CHLORIDE, SODIUM LACTATE, POTASSIUM CHLORIDE, CALCIUM CHLORIDE: 600; 310; 30; 20 INJECTION, SOLUTION INTRAVENOUS at 10:28

## 2023-01-09 ASSESSMENT — LIFESTYLE VARIABLES: TOBACCO_USE: 1

## 2023-01-09 ASSESSMENT — VISUAL ACUITY
METHOD: NEAR CARD
OD_SC: 20/50
OS_SC: 20/25

## 2023-01-09 ASSESSMENT — SLIT LAMP EXAM - LIDS
COMMENTS: NORMAL
COMMENTS: NORMAL

## 2023-01-09 ASSESSMENT — TONOMETRY
OD_IOP_MMHG: 17
IOP_METHOD: TONOPEN

## 2023-01-09 NOTE — OP NOTE
PREOPERATIVE DIAGNOSIS:   1. Combined form of age-related cataract, right eye    POSTOPERATIVE DIAGNOSIS: Same   PROCEDURES:   1. Cataract extraction with intraocular lens implant Right eye.  SURGEON: Ap Hernandez M.D.  Assistant: Shaista Durán MD   INDICATIONS: The patient Yuko Blanchard presented to the eye clinic with decreased vision secondary to cataract in the Right eye. The risks, benefits and alternatives to cataract extraction were discussed. The patient elected to proceed. All questions were answered to the patient's satisfaction.   DESCRIPTION OF PROCEDURE:   Prior to the procedure, appropriate cardiac and respiratory monitors were applied to the patient.  In the pre-operative holding area, a drop of topical tetracaine followed by lidocaine gel followed by povidone iodine.  The patient was brought to the operating room where a surgical pause was carried out to identify with all members of the surgical team the correct surgical site.  With adequate anesthesia, the Right eye was prepped and draped in the usual sterile fashion. A lid speculum was placed, and the operating microscope was rotated into position. A paracentesis was created.  Through this limbal paracentesis, the anterior chamber was filled with preservative-free lidocaine followed by viscoelastic.  A temporal wound was created at the limbus using a 2.6 mm blade. A capsulorrhexis was initiated using a bent 25-gauge needle and was completed in continuous and circular fashion using the capsulorrhexis forceps. The lens nucleus was hydrodissected using balanced salt solution.  The lens nucleus was rotated and removed using phacoemulsification in a stop and chop technique.  Residual cortical material was removed using irrigation-aspiration.  The capsular bag was reinflated to its maximal extent with cohesive viscoelastic.  A 6.5 diopter DIBOO inserted into the capsular bag.  The lens power selected was reviewed using the intraocular lens power  measurements that were obtained preoperatively to confirm that the correct lens was selected for the desired post-operative refractive state. The residual viscoelastic was removed in its entirety, the wound were hydrated and found to be self-sealing.  Intracameral moxifloxacin was administered. Tactile pressure was confirmed to be in a normal range.  Subconjunctival Dexamethasone (2 mg) was injected.. The lid speculum was removed and a patch and shield were applied.  The patient tolerated the procedure well, and there were no complications.    PLAN: The patient will be discharged to home and will follow up tomorrow morning in the eye clinic.  EBL:  None  Complications:  None  Implant Name Type Inv. Item Serial No.  Lot No. LRB No. Used Action   EYE IMP IOL TECNIS IOL DIB00 6.5 DIB00 6.5 - [id number] Lens/Eye Implant EYE IMP IOL TECNIS IOL DIB00 6.5 DIB00 6.5 [id number] [employer]ISION  Right 1 Implanted          Attending Physician Procedure Attestation: I was present for the entire procedure       Ap Hernandez MD  , Comprehensive Ophthalmology  Department of Ophthalmology and Visual Neurosciences  [facility]

## 2023-01-09 NOTE — ANESTHESIA PREPROCEDURE EVALUATION
Anesthesia Pre-Procedure Evaluation    Patient: Yuko Blanchard   MRN: [id number] : 1955        Procedure : Procedure(s):  PHACOEMULSIFICATION, CATARACT, WITH STANDARD INTRAOCULAR LENS IMPLANT INSERTION-RIGHT EYE          Past Medical History:   Diagnosis Date     Diabetes (H) type 2      History of colonic polyps      Macular degeneration (senile) of retina      Proliferative retinopathy of right eye       Past Surgical History:   Procedure Laterality Date     COLONOSCOPY      hx of polyps     COLONOSCOPY N/A 10/13/2022    Procedure: COLONOSCOPY, WITH POLYPECTOMY;  Surgeon: Dahlia Holley MD;  Location: [facility] OR     HAND SURGERY Left     lost few fingertips     PHACOEMULSIFICATION WITH STANDARD INTRAOCULAR LENS IMPLANT Left 2023    Procedure: PHACOEMULSIFICATION, CATARACT, WITH STANDARD INTRAOCULAR LENS IMPLANT INSERTION-LEFT EYE;  Surgeon: Ap Hernandez MD;  Location: [facility] OR     wisdom teeth        No Known Allergies   Social History     Tobacco Use     Smoking status: Former     Types: Cigarettes     Start date:      Quit date:      Years since quittin.0     Smokeless tobacco: Never   Substance Use Topics     Alcohol use: Not on file      Wt Readings from Last 1 Encounters:   23 71.7 kg (158 lb)        Anesthesia Evaluation            ROS/MED HX  ENT/Pulmonary:     (+) tobacco use, Past use,     Neurologic:       Cardiovascular:       METS/Exercise Tolerance:     Hematologic:       Musculoskeletal:       GI/Hepatic:       Renal/Genitourinary:       Endo:     (+) type II DM,     Psychiatric/Substance Use:       Infectious Disease:       Malignancy:       Other:            Physical Exam    Airway  airway exam normal           Respiratory Devices and Support         Dental  no notable dental history         Cardiovascular   cardiovascular exam normal          Pulmonary   pulmonary exam normal                OUTSIDE LABS:  CBC: No results found for: WBC, HGB, HCT, PLT  BMP:    Lab Results   Component Value Date     10/20/2022    POTASSIUM 4.4 10/20/2022    CHLORIDE 101 10/20/2022    CO2 25 10/20/2022    BUN 9.8 10/20/2022    CR 0.61 10/20/2022     (H) 01/02/2023     (H) 10/20/2022     COAGS: No results found for: PTT, INR, FIBR  POC: No results found for: BGM, HCG, HCGS  HEPATIC: No results found for: ALBUMIN, PROTTOTAL, ALT, AST, GGT, ALKPHOS, BILITOTAL, BILIDIRECT, ASHLEY  OTHER:   Lab Results   Component Value Date    A1C 6.0 (H) 10/20/2022    ARMANDO 10.1 10/20/2022       Anesthesia Plan    ASA Status:  2   NPO Status:  NPO Appropriate    Anesthesia Type: MAC.     - Reason for MAC: straight local not clinically adequate   Induction: Intravenous.   Maintenance: TIVA.        Consents    Anesthesia Plan(s) and associated risks, benefits, and realistic alternatives discussed. Questions answered and patient/representative(s) expressed understanding.    - Discussed:     - Discussed with:  Patient      - Extended Intubation/Ventilatory Support Discussed: No.      - Patient is DNR/DNI Status: No    Use of blood products discussed: No .     Postoperative Care            Comments:                    Manuel Weiss MD

## 2023-01-09 NOTE — DISCHARGE INSTRUCTIONS
[facility]  Home Care Following Anesthesia  For 24 hours after surgery:  Get plenty of rest.  A responsible adult must stay with you for at least 24 hours after you leave the surgery center.  Do not drive or use heavy equipment.  If you have weakness or tingling, don't drive or use heavy equipment until this feeling goes away.   Do not drink alcohol.   Avoid strenuous or risky activities.  Ask for help when climbing stairs.  You may feel lightheaded.  IF so, sit for a few minutes before standing.  Have someone help you get up.   If you have nausea (feel sick to your stomach): Drink only clear liquids such as apple juice, ginger ale, broth or 7-Up.  Rest may also help.  Be sure to drink enough fluids.  Move to a regular diet as you feel able.   You may have a slight fever.  Call the doctor if your fever is over 100 F (37.7 C) (taken under the tongue) or lasts longer than 24 hours.  You may have a dry mouth, a sore throat, muscle aches or trouble sleeping. These should go away after 24 hours.  Do not make important or legal decisions.   It is recommended to avoid smoking.          Tips for taking pain medications  To get the best pain relief possible, remember these points:  Take pain medications as directed, before pain becomes severe.  Pain medication can upset your stomach: taking it with food may help.  Constipation is a common side effect of pain medication. Drink plenty of  fluids.  Eat foods high in fiber. Take a stool softener if recommended by your doctor or pharmacist.  Do not drink alcohol, drive or operate machinery while taking pain medications.  Ask about other ways to control pain, such as with heat, ice or relaxation.    Tylenol/Acetaminophen Consumption  To help encourage the safe use of acetaminophen, the makers of TYLENOL  have lowered the maximum daily dose for single-ingredient Extra Strength TYLENOL  (acetaminophen) products sold in the [place] from 8 pills per  day (4,000 mg) to 6 pills per day (3,000 mg). The dosing interval has also changed from 2 pills every 4-6 hours to 2 pills every 6 hours.  If you feel your pain relief is insufficient, you may take Tylenol/Acetaminophen in addition to your narcotic pain medication.   Be careful not to exceed 3,000 mg of Tylenol/Acetaminophen in a 24 hour period from all sources.  If you are taking extra strength Tylenol/acetaminophen (500 mg), the maximum dose is 6 tablets in 24 hours.  If you are taking regular strength acetaminophen (325 mg), the maximum dose is 9 tablets in 24 hours.    Call a doctor for any of the following:  Signs of infection (fever, growing tenderness at the surgery site, a large amount of drainage or bleeding, severe pain, foul-smelling drainage, redness, swelling).  It has been over 8 to 10 hours since surgery and you are still not able to urinate (pass water).  Headache for over 24 hours.  Numbness, tingling or weakness the day after surgery (if you had spinal anesthesia).  Signs of Covid-19 infection (temperature over 100 degrees, shortness of breath, cough, loss of taste/smell, generalized body aches, persistent headache, chills, sore throat, nausea/vomiting/diarrhea)  Your doctor is:       Dr. Ap Hernandez, Ophthalmology: [phone]               Or dial [phone] and ask for the resident on call for:  Ophthalmology  For emergency care, call the:  [facility] Department:  [phone] (TTY for hearing impaired: [phone])

## 2023-01-09 NOTE — ANESTHESIA CARE TRANSFER NOTE
Patient: Yuko Blanchard    Procedure: Procedure(s):  PHACOEMULSIFICATION, CATARACT, WITH STANDARD INTRAOCULAR LENS IMPLANT INSERTION-RIGHT EYE       Diagnosis: Combined form of age-related cataract, both eyes [H25.813]  Diagnosis Additional Information: No value filed.    Anesthesia Type:   No value filed.     Note:    Oropharynx: oropharynx clear of all foreign objects and spontaneously breathing  Level of Consciousness: awake  Oxygen Supplementation: room air    Independent Airway: airway patency satisfactory and stable  Dentition: dentition unchanged  Vital Signs Stable: post-procedure vital signs reviewed and stable  Report to RN Given: handoff report given            Vitals:  Vitals Value Taken Time   BP     Temp     Pulse     Resp     SpO2         Electronically Signed By: KIRAN Cooley CRNA  January 9, 2023  12:04 PM

## 2023-01-09 NOTE — BRIEF OP NOTE
[facility]    Brief Operative Note    Pre-operative diagnosis: Combined form of age-related cataract, both eyes [H25.813]  Post-operative diagnosis Same as pre-operative diagnosis    Procedure: Procedure(s):  PHACOEMULSIFICATION, CATARACT, WITH STANDARD INTRAOCULAR LENS IMPLANT INSERTION-RIGHT EYE  Surgeon: Surgeon(s) and Role:     * Ap Hernandez MD - Primary     * Shaista Durán MD - Resident - Assisting  Anesthesia: MAC with Topical   Estimated Blood Loss: None    Drains: None  Specimens: * No specimens in log *  Findings:   None.  Complications: None.  Implants:   Implant Name Type Inv. Item Serial No.  Lot No. LRB No. Used Action   EYE IMP IOL TECNIS IOL DIB00 6.5 DIB00 6.5 - [id number] Lens/Eye Implant EYE IMP IOL TECNIS IOL DIB00 6.5 DIB00 6.5 [id number] [employer]  Right 1 Implanted

## 2023-01-09 NOTE — PROGRESS NOTES
POD#0, status post cataract surgery, right eye     Impression/Plan:  Pseudophakia, right eye: POD0, good post-operative appearance. IOP reasonable.     Eye protection at all times and eye shield at night for 1 week.     Limited activities with no exercise or heavy lifting for 1 week.     Instructed patient to contact us for decreasing vision, eye pain, new floaters or flashes of light or other concerning symptoms.     Written instructions given    Drops:  Moxifloxacin TID for 7 days in operative eye  Prednisolone 4/3/2/1 taper every 7 days in operative eye  Ketorolac for 14 days in operative eye      All questions were answered    Shaista Durán MD  Ophthalmology Resident, PGY-4  [facility]     Attending Physician Attestation:  Complete documentation of historical and exam elements from today's encounter can be found in the full encounter summary report (not reduplicated in this progress note).  I personally obtained the chief complaint(s) and history of present illness.  I confirmed and edited as necessary the review of systems, past medical/surgical history, family history, social history, and examination findings as documented by others; and I examined the patient myself.  I personally reviewed the relevant tests, images, and reports as documented above.  I formulated and edited as necessary the assessment and plan and discussed the findings and management plan with the patient and family. . - Ap Hernandez MD

## 2023-01-09 NOTE — ANESTHESIA POSTPROCEDURE EVALUATION
Patient: Yuko Blanchard    Procedure: Procedure(s):  PHACOEMULSIFICATION, CATARACT, WITH STANDARD INTRAOCULAR LENS IMPLANT INSERTION-RIGHT EYE       Anesthesia Type:  No value filed.    Note:  Disposition: Outpatient   Postop Pain Control: Uneventful            Sign Out: Well controlled pain   PONV: No   Neuro/Psych: Uneventful            Sign Out: Acceptable/Baseline neuro status   Airway/Respiratory: Uneventful            Sign Out: Acceptable/Baseline resp. status   CV/Hemodynamics: Uneventful            Sign Out: Acceptable CV status   Other NRE: NONE   DID A NON-ROUTINE EVENT OCCUR? No           Last vitals:  Vitals Value Taken Time   /75 01/09/23 1231   Temp 36.2  C (97.1  F) 01/09/23 1204   Pulse 65 01/09/23 1231   Resp 16 01/09/23 1231   SpO2 99 % 01/09/23 1231       Electronically Signed By: Manuel Weiss MD  January 9, 2023  2:02 PM

## 2023-01-17 ENCOUNTER — OFFICE VISIT (OUTPATIENT)
Dept: OPHTHALMOLOGY | Facility: CLINIC | Age: 68
End: 2023-01-17
Attending: OPHTHALMOLOGY
Payer: COMMERCIAL

## 2023-01-17 DIAGNOSIS — H35.30 ARMD (AGE-RELATED MACULAR DEGENERATION), BILATERAL: ICD-10-CM

## 2023-01-17 DIAGNOSIS — Z96.1 PSEUDOPHAKIA OF BOTH EYES: Primary | ICD-10-CM

## 2023-01-17 DIAGNOSIS — E11.3299 NONPROLIFERATIVE DIABETIC RETINOPATHY ASSOCIATED WITH TYPE 2 DIABETES MELLITUS (H): ICD-10-CM

## 2023-01-17 PROCEDURE — 99024 POSTOP FOLLOW-UP VISIT: CPT | Performed by: OPHTHALMOLOGY

## 2023-01-17 PROCEDURE — 92015 DETERMINE REFRACTIVE STATE: CPT

## 2023-01-17 PROCEDURE — G0463 HOSPITAL OUTPT CLINIC VISIT: HCPCS

## 2023-01-17 ASSESSMENT — SLIT LAMP EXAM - LIDS
COMMENTS: NORMAL
COMMENTS: NORMAL

## 2023-01-17 ASSESSMENT — VISUAL ACUITY
OS_PH_SC: 20/50
OD_PH_SC: 20/50
OS_SC: J1
OD_SC: J1
OD_SC: 20/200
OD_PH_SC+: -1
OS_SC: 20/250
OS_PH_SC+: -1
METHOD: SNELLEN - LINEAR

## 2023-01-17 ASSESSMENT — REFRACTION_MANIFEST
OS_CYLINDER: +0.75
OD_CYLINDER: +0.50
OS_AXIS: 078
OD_AXIS: 097
OS_SPHERE: -3.25
OD_SPHERE: -3.25
OD_ADD: +2.75
OS_ADD: +2.75

## 2023-01-17 ASSESSMENT — TONOMETRY
OS_IOP_MMHG: 13
IOP_METHOD: TONOPEN
OD_IOP_MMHG: 17

## 2023-01-17 NOTE — NURSING NOTE
Chief Complaints and History of Present Illnesses   Patient presents with     Post Op (Ophthalmology) Both Eyes     PHACOEMULSIFICATION, CATARACT, WITH STANDARD INTRAOCULAR LENS IMPLANT INSERTION-RIGHT EYE  1/9/23  PHACOEMULSIFICATION, CATARACT, WITH STANDARD INTRAOCULAR LENS IMPLANT INSERTION-LEFT EYE  1/2/23     Chief Complaint(s) and History of Present Illness(es)     Post Op (Ophthalmology) Both Eyes            Comments: PHACOEMULSIFICATION, CATARACT, WITH STANDARD INTRAOCULAR LENS IMPLANT INSERTION-RIGHT EYE  1/9/23  PHACOEMULSIFICATION, CATARACT, WITH STANDARD INTRAOCULAR LENS IMPLANT INSERTION-LEFT EYE  1/2/23          Comments    Pt doing well , very happy with vision  Pt stataes no concerns.  No eye pain    Margot Hernandez COT 1:08 PM January 17, 2023

## 2023-01-17 NOTE — PROGRESS NOTES
Chief Complaint(s) and History of Present Illness(es)     Post Op (Ophthalmology) Both Eyes            Comments: PHACOEMULSIFICATION, CATARACT, WITH STANDARD INTRAOCULAR LENS   IMPLANT INSERTION-RIGHT EYE  1/9/23  PHACOEMULSIFICATION, CATARACT, WITH STANDARD INTRAOCULAR LENS IMPLANT   INSERTION-LEFT EYE  1/2/23          Comments    Pt doing well , very happy with vision  Pt stataes no concerns.  No eye pain    Margot Hernandez COT 1:08 PM January 17, 2023                    Review of systems for the eyes was negative other than the pertinent positives/negatives listed in the HPI.      Assessment & Plan      Yuko Blanchard is a 67 year old female with the following diagnoses:   1. Pseudophakia of both eyes    2. Nonproliferative diabetic retinopathy associated with type 2 diabetes mellitus (H)    3. ARMD (age-related macular degeneration), bilateral         Doing well  Ok to resume normal activities  Taper Predforte as directed  Glasses prescription updated  Artificial tears as needed        Patient disposition:   Return in about 4 weeks (around 2/14/2023) for Refraction, VT only.           Attending Physician Attestation:  Complete documentation of historical and exam elements from today's encounter can be found in the full encounter summary report (not reduplicated in this progress note).  I personally obtained the chief complaint(s) and history of present illness.  I confirmed and edited as necessary the review of systems, past medical/surgical history, family history, social history, and examination findings as documented by others; and I examined the patient myself.  I personally reviewed the relevant tests, images, and reports as documented above.  I formulated and edited as necessary the assessment and plan and discussed the findings and management plan with the patient and family. . - Ap Hernandez MD

## 2023-02-07 DIAGNOSIS — E11.3299 NONPROLIFERATIVE DIABETIC RETINOPATHY ASSOCIATED WITH TYPE 2 DIABETES MELLITUS (H): ICD-10-CM

## 2023-02-07 DIAGNOSIS — H35.30 ARMD (AGE-RELATED MACULAR DEGENERATION), BILATERAL: Primary | ICD-10-CM

## 2023-02-21 ENCOUNTER — OFFICE VISIT (OUTPATIENT)
Dept: OPHTHALMOLOGY | Facility: CLINIC | Age: 68
End: 2023-02-21
Attending: OPHTHALMOLOGY
Payer: COMMERCIAL

## 2023-02-21 DIAGNOSIS — H35.30 ARMD (AGE-RELATED MACULAR DEGENERATION), BILATERAL: ICD-10-CM

## 2023-02-21 DIAGNOSIS — E11.3299 NONPROLIFERATIVE DIABETIC RETINOPATHY ASSOCIATED WITH TYPE 2 DIABETES MELLITUS (H): ICD-10-CM

## 2023-02-21 DIAGNOSIS — Z96.1 PSEUDOPHAKIA OF BOTH EYES: ICD-10-CM

## 2023-02-21 DIAGNOSIS — H35.052 CHOROIDAL NEOVASCULAR MEMBRANE OF LEFT EYE: ICD-10-CM

## 2023-02-21 DIAGNOSIS — Z98.890 STATUS POST EYE SURGERY: Primary | ICD-10-CM

## 2023-02-21 DIAGNOSIS — H44.23 UNCOMPLICATED DEGENERATIVE MYOPIA OF BOTH EYES: ICD-10-CM

## 2023-02-21 DIAGNOSIS — H44.2D3 BOTH EYES AFFECTED BY DEGENERATIVE MYOPIA WITH FOVEOSCHISIS: Primary | ICD-10-CM

## 2023-02-21 PROCEDURE — 99207 FUNDUS PHOTOS OU (BOTH EYES): CPT | Mod: 26 | Performed by: OPHTHALMOLOGY

## 2023-02-21 PROCEDURE — 92250 FUNDUS PHOTOGRAPHY W/I&R: CPT | Performed by: OPHTHALMOLOGY

## 2023-02-21 PROCEDURE — 99024 POSTOP FOLLOW-UP VISIT: CPT | Mod: GC | Performed by: OPHTHALMOLOGY

## 2023-02-21 PROCEDURE — 999N000103 HC STATISTIC NO CHARGE FACILITY FEE

## 2023-02-21 PROCEDURE — 99214 OFFICE O/P EST MOD 30 MIN: CPT | Mod: 24 | Performed by: OPHTHALMOLOGY

## 2023-02-21 PROCEDURE — 92134 CPTRZ OPH DX IMG PST SGM RTA: CPT | Performed by: OPHTHALMOLOGY

## 2023-02-21 PROCEDURE — G0463 HOSPITAL OUTPT CLINIC VISIT: HCPCS | Mod: 25

## 2023-02-21 ASSESSMENT — VISUAL ACUITY
CORRECTION_TYPE: GLASSES
OS_CC: 20/20
METHOD: SNELLEN - LINEAR
OS_CC: 20/20
OD_CC: 20/20
OD_CC: 20/20
OS_CC+: -3
OS_CC+: -3
CORRECTION_TYPE: GLASSES
METHOD: SNELLEN - LINEAR

## 2023-02-21 ASSESSMENT — EXTERNAL EXAM - RIGHT EYE
OD_EXAM: NORMAL
OD_EXAM: NORMAL

## 2023-02-21 ASSESSMENT — CONF VISUAL FIELD
OD_NORMAL: 1
OS_INFERIOR_NASAL_RESTRICTION: 0
OS_SUPERIOR_NASAL_RESTRICTION: 0
OD_INFERIOR_TEMPORAL_RESTRICTION: 0
OS_SUPERIOR_TEMPORAL_RESTRICTION: 0
OD_NORMAL: 1
OS_SUPERIOR_TEMPORAL_RESTRICTION: 0
OD_SUPERIOR_TEMPORAL_RESTRICTION: 0
OS_NORMAL: 1
OS_INFERIOR_TEMPORAL_RESTRICTION: 0
OD_INFERIOR_NASAL_RESTRICTION: 0
OS_INFERIOR_TEMPORAL_RESTRICTION: 0
OD_INFERIOR_NASAL_RESTRICTION: 0
OD_SUPERIOR_NASAL_RESTRICTION: 0
OS_INFERIOR_NASAL_RESTRICTION: 0
OD_SUPERIOR_NASAL_RESTRICTION: 0
METHOD: COUNTING FINGERS
METHOD: COUNTING FINGERS
OS_SUPERIOR_NASAL_RESTRICTION: 0
OD_INFERIOR_TEMPORAL_RESTRICTION: 0
OD_SUPERIOR_TEMPORAL_RESTRICTION: 0
OS_NORMAL: 1

## 2023-02-21 ASSESSMENT — REFRACTION_WEARINGRX
OS_SPHERE: -3.25
OS_ADD: +2.75
OS_AXIS: 078
OD_CYLINDER: +0.50
OS_CYLINDER: +0.75
OD_ADD: +2.75
OS_CYLINDER: +0.75
OS_SPHERE: -3.25
OS_ADD: +2.75
OD_CYLINDER: +0.50
SPECS_TYPE: PAL
OS_AXIS: 078
OD_SPHERE: -3.25
OD_AXIS: 097
OD_ADD: +2.75
SPECS_TYPE: PAL
OD_SPHERE: -3.25
OD_AXIS: 097

## 2023-02-21 ASSESSMENT — SLIT LAMP EXAM - LIDS
COMMENTS: NORMAL

## 2023-02-21 ASSESSMENT — CUP TO DISC RATIO
OD_RATIO: 0.4
OS_RATIO: 0.3
OS_RATIO: 0.3
OD_RATIO: 0.4

## 2023-02-21 ASSESSMENT — TONOMETRY
OD_IOP_MMHG: 22
OS_IOP_MMHG: 19
IOP_METHOD: TONOPEN
IOP_METHOD: TONOPEN
OD_IOP_MMHG: 22
OS_IOP_MMHG: 19

## 2023-02-21 ASSESSMENT — REFRACTION_MANIFEST
OS_CYLINDER: +0.75
OS_SPHERE: -3.25
OS_CYLINDER: +0.75
OS_AXIS: 078
OS_AXIS: 078
OS_SPHERE: -3.25

## 2023-02-21 ASSESSMENT — EXTERNAL EXAM - LEFT EYE
OS_EXAM: NORMAL
OS_EXAM: NORMAL

## 2023-02-21 NOTE — PROGRESS NOTES
Chief Complaint(s) and History of Present Illness(es)     Post Op (Ophthalmology) Both Eyes            Laterality: both eyes    Course: stable    Associated symptoms: floaters (left eye has a few black dots that move   about).  Negative for dryness, eye pain, flashes and itching    Treatments tried: eye drops    Pain scale: 0/10    Comments: Cataract extraction with IOL in the right eye on 01/09/2023  Cataract extraction with IOL in the left eye on 01/02/2023                Comments    Patient states that she got new glasses since her last exam.  She tells me   that she is seeing better now than she has in decades!  She denies having   any eye discomfort.    She uses allergy eye drops and is asymptomatic with regard to her   allergies.     Justa Cardenas, COT 8:28 AM  February 21, 2023                  Review of systems for the eyes was negative other than the pertinent positives/negatives listed in the HPI.      Assessment & Plan      Yuko Blanchard is a 67 year old female with the following diagnoses:   1. Status post eye surgery    2. Pseudophakia of both eyes    3. Nonproliferative diabetic retinopathy associated with type 2 diabetes mellitus (H)    4. ARMD (age-related macular degeneration), bilateral    5. Uncomplicated degenerative myopia of both eyes       POW7 CEIOL in the left eye  POW6 CEIOL in the right eye  Doing well, happy with outcome    There is rare cell in both eyes in the anterior chamber; rare cell has been noted in the past in the vitreous in both eyes post injections suspected to be silicone oil. No prior ocular surgeries outside of the above.     Happy with new glasses  Seeing Dr. Junior later today, will continue ongoing cares from here    Patient disposition:     Hernandez as needed          Leeanna Flores MD  PGY-4 Resident Physician  Department of Ophthalmology    Attending Physician Attestation:  Complete documentation of historical and exam elements from today's encounter can be found in the  full encounter summary report (not reduplicated in this progress note).  I personally obtained the chief complaint(s) and history of present illness.  I confirmed and edited as necessary the review of systems, past medical/surgical history, family history, social history, and examination findings as documented by others; and I examined the patient myself.  I personally reviewed the relevant tests, images, and reports as documented above.  I formulated and edited as necessary the assessment and plan and discussed the findings and management plan with the patient and family. . - Ap Hernandez MD

## 2023-02-21 NOTE — PROGRESS NOTES
CC -   Myopic Degeneration    INTERVAL HISTORY -vision improved significantly after CE IOL each eye  CE IOL + avastin left eye on 01/02/2023  CE IOL right eye 01/09/2023      HPI -   Yuko Blanchard is a  67 year old year-old patient presented for myopic degeneration OU and diabetic eye exam. She was previously following with Dr. Murtaza Trinh MD at [facility].     Past ocular history of Myopic Degeneration with Retinal Neovascularization OU, Type 2 DM with no insulin use (diagnosed approximately 2018), ERM OS, PVD OU, and cataract OU. Denies curtains, flashing lights, increase in floaters. Reports stable vision.    She had a history of Avastin injections OU (uncertain about the exact number); starting roughly 8 years prior in the left eye and 1.5 years ago in the right eye. She reports most recently, she was extended to a 4 month interval.     Her last injection each eye 4/7/2022    PAST OCULAR SURGERY  -Denies ocular surgery and laser procedures    RETINAL IMAGING:  OCT 2/21/23  OD - PHF off of macula; ERM with schisis over the post staphyloma area and no obvious subretinal fluid; stable  OS - PHF off of macula; ERM with schisis and subretinal fluid inferiorly;  improved SRF and IRF    FAF 08/10/22  OD- areas of hypo-AF nasal and inferonasal to fovea; window defect at macula but no active CNV  OS- Scattered hyper-AF within large area of hypo-AF inferior macula with leakage     FAF compatible with examination and findings    ASSESSMENT & PLAN    # Myopic Degeneration with CNV OU    -s/p Avastin each eye    -No active hemorrhage noted    -right eye no signs of active CNVM; left eye subretinal fluid 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   -avastin left eye 8/10/22 and 9/14/22 and 1/2/23     -SRF almost resolved at first post cataract surgery retina examination   - I still think SRF and IRF (schisis) is mostly likely due to myopic degeneration (over posterior staphyloma  area) that fluctuates naturally not myopic CNVM: observe with no injections at this time   - RTC 3 months for DFE and OCT     # PVD OU    -Signs and symptoms of RD/RT discussed 08/10/22    # DM Type II with mild NPDR right eye no signs of DR left eye   -Last A1c 6.1 on 9/8/21   -Good BP and blood glucose control recommended    # High Myopia OU   - peripheral examination unremarkable   - 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 I personally obtained the chief complaint(s) and history of present illness.  I confirmed and edited as necessary the review of systems, past medical/surgical history, family history, social history, and examination findings as documented by others; and I examined the patient myself. I personally reviewed the relevant tests, images, and reports as documented above. I formulated and edited as necessary the assessment and plan and discussed the findings and management plan with the patient and family.     Solo Junior MD

## 2023-02-21 NOTE — NURSING NOTE
Chief Complaints and History of Present Illnesses   Patient presents with     Follow Up     Choroidal neovascular membrane of left eye     Chief Complaint(s) and History of Present Illness(es)     Follow Up            Laterality: both eyes    Course: stable    Associated symptoms: floaters (left eye, a few black dots).  Negative for dryness, eye pain, flashes and discharge    Treatments tried: eye drops    Pain scale: 0/10    Comments: Choroidal neovascular membrane of left eye          Comments    Patient states that she got new glasses since her last exam.  She tells me that she is seeing better now than she has in decades!  She denies having any eye discomfort.    She uses allergy eye drops and is asymptomatic with regard to her allergies.     Justa Cardenas, COT 8:28 AM  February 21, 2023

## 2023-02-21 NOTE — NURSING NOTE
Chief Complaints and History of Present Illnesses   Patient presents with     Post Op (Ophthalmology) Both Eyes     Cataract extraction with IOL in the right eye on 01/09/2023  Cataract extraction with IOL in the left eye on 01/02/2023           Chief Complaint(s) and History of Present Illness(es)     Post Op (Ophthalmology) Both Eyes            Laterality: both eyes    Course: stable    Associated symptoms: floaters (left eye has a few black dots that move about).  Negative for dryness, eye pain, flashes and itching    Treatments tried: eye drops    Pain scale: 0/10    Comments: Cataract extraction with IOL in the right eye on 01/09/2023  Cataract extraction with IOL in the left eye on 01/02/2023                Comments    Patient states that she got new glasses since her last exam.  She tells me that she is seeing better now than she has in decades!  She denies having any eye discomfort.    She uses allergy eye drops and is asymptomatic with regard to her allergies.     Justa Cardenas, COT 8:28 AM  February 21, 2023

## 2023-03-26 ENCOUNTER — HEALTH MAINTENANCE LETTER (OUTPATIENT)
Age: 68
End: 2023-03-26

## 2023-05-10 DIAGNOSIS — H35.30 ARMD (AGE-RELATED MACULAR DEGENERATION), BILATERAL: Primary | ICD-10-CM

## 2023-05-23 ENCOUNTER — OFFICE VISIT (OUTPATIENT)
Dept: OPHTHALMOLOGY | Facility: CLINIC | Age: 68
End: 2023-05-23
Attending: OPHTHALMOLOGY
Payer: COMMERCIAL

## 2023-05-23 DIAGNOSIS — E11.3299 NONPROLIFERATIVE DIABETIC RETINOPATHY ASSOCIATED WITH TYPE 2 DIABETES MELLITUS (H): ICD-10-CM

## 2023-05-23 DIAGNOSIS — H35.052 CHOROIDAL NEOVASCULAR MEMBRANE OF LEFT EYE: ICD-10-CM

## 2023-05-23 DIAGNOSIS — H44.23 UNCOMPLICATED DEGENERATIVE MYOPIA OF BOTH EYES: ICD-10-CM

## 2023-05-23 DIAGNOSIS — Z96.1 PSEUDOPHAKIA OF BOTH EYES: ICD-10-CM

## 2023-05-23 DIAGNOSIS — H44.2D3 BOTH EYES AFFECTED BY DEGENERATIVE MYOPIA WITH FOVEOSCHISIS: Primary | ICD-10-CM

## 2023-05-23 PROCEDURE — 92134 CPTRZ OPH DX IMG PST SGM RTA: CPT | Performed by: OPHTHALMOLOGY

## 2023-05-23 PROCEDURE — 99214 OFFICE O/P EST MOD 30 MIN: CPT | Performed by: OPHTHALMOLOGY

## 2023-05-23 PROCEDURE — G0463 HOSPITAL OUTPT CLINIC VISIT: HCPCS | Performed by: OPHTHALMOLOGY

## 2023-05-23 PROCEDURE — 92250 FUNDUS PHOTOGRAPHY W/I&R: CPT | Performed by: OPHTHALMOLOGY

## 2023-05-23 PROCEDURE — 99207 FUNDUS PHOTOS OU (BOTH EYES): CPT | Mod: 26 | Performed by: OPHTHALMOLOGY

## 2023-05-23 RX ORDER — FLUTICASONE PROPIONATE 50 MCG
1 SPRAY, SUSPENSION (ML) NASAL DAILY
COMMUNITY

## 2023-05-23 ASSESSMENT — REFRACTION_WEARINGRX
OD_ADD: +2.75
OS_SPHERE: -3.25
OS_CYLINDER: +0.75
OD_AXIS: 097
OD_CYLINDER: +0.50
OS_ADD: +2.75
SPECS_TYPE: PAL
OD_SPHERE: -3.25
OS_AXIS: 078

## 2023-05-23 ASSESSMENT — TONOMETRY
OD_IOP_MMHG: 27
OS_IOP_MMHG: 18
IOP_METHOD: TONOPEN
IOP_METHOD: TONOPEN
OD_IOP_MMHG: 18

## 2023-05-23 ASSESSMENT — CONF VISUAL FIELD
OD_NORMAL: 1
OS_SUPERIOR_TEMPORAL_RESTRICTION: 0
OD_SUPERIOR_TEMPORAL_RESTRICTION: 0
OS_INFERIOR_TEMPORAL_RESTRICTION: 0
OS_INFERIOR_NASAL_RESTRICTION: 0
OD_SUPERIOR_NASAL_RESTRICTION: 0
OS_SUPERIOR_NASAL_RESTRICTION: 0
OD_INFERIOR_NASAL_RESTRICTION: 0
OS_NORMAL: 1
METHOD: COUNTING FINGERS
OD_INFERIOR_TEMPORAL_RESTRICTION: 0

## 2023-05-23 ASSESSMENT — CUP TO DISC RATIO
OS_RATIO: 0.3
OD_RATIO: 0.4

## 2023-05-23 ASSESSMENT — SLIT LAMP EXAM - LIDS
COMMENTS: NORMAL
COMMENTS: NORMAL

## 2023-05-23 ASSESSMENT — EXTERNAL EXAM - LEFT EYE: OS_EXAM: NORMAL

## 2023-05-23 ASSESSMENT — VISUAL ACUITY
OD_CC: 20/15
CORRECTION_TYPE: GLASSES
METHOD: SNELLEN - LINEAR
OS_CC+: +2
OS_CC: 20/25

## 2023-05-23 ASSESSMENT — EXTERNAL EXAM - RIGHT EYE: OD_EXAM: NORMAL

## 2023-05-23 NOTE — NURSING NOTE
"Chief Complaints and History of Present Illnesses   Patient presents with     Follow Up     3 month follow up Myopic Degeneration with CNV OU      Chief Complaint(s) and History of Present Illness(es)     Follow Up            Laterality: both eyes    Associated symptoms: Negative for dryness, eye pain and flashes    Treatments tried: eye drops    Pain scale: 0/10    Comments: 3 month follow up Myopic Degeneration with CNV OU           Comments    Pt states vision lately \"has been amazing!\"   She is using Zaditor once daily both eyes for allergy relief.  Stable small black dot left eye, no flashes of light.     DM2  LBS : 102 this AM  Lab Results       Component                Value               Date                       A1C                      6.0                 10/20/2022              Ramon Ho 10:01 AM May 23, 2023                    "

## 2023-05-23 NOTE — PROGRESS NOTES
CC -   Myopic Degeneration    INTERVAL HISTORY -vision improved significantly after CE IOL each eye (Hernandez)  CE IOL + avastin left eye on 01/02/2023  CE IOL right eye 01/09/2023      HPI -   Yuko Blanchard is a  67 year old year-old patient presented for myopic degeneration OU and diabetic eye exam. She was previously following with Dr. Murtaza Trinh MD at [facility].     Past ocular history of Myopic Degeneration with Retinal Neovascularization OU, Type 2 DM with no insulin use (diagnosed approximately 2018), ERM OS, PVD OU, and cataract OU. Denies curtains, flashing lights, increase in floaters. Reports stable vision.    She had a history of Avastin injections OU (uncertain about the exact number); starting roughly 8 years prior in the left eye and 1.5 years ago in the right eye. She reports most recently, she was extended to a 4 month interval.     Her last injection each eye 4/7/2022    PAST OCULAR SURGERY  -Denies ocular surgery and laser procedures    RETINAL IMAGING:  OCT 5/23/23  OD - PHF off of macula; ERM with schisis over the post staphyloma area and no obvious subretinal fluid; stable  OS - PHF off of macula; ERM with schisis and subretinal fluid inferiorly;  improved SRF and IRF; stable     FAF 08/10/22  OD- areas of hypo-AF nasal and inferonasal to fovea; window defect at macula but no active CNV  OS- Scattered hyper-AF within large area of hypo-AF inferior macula with leakage     FAF compatible with examination and findings    ASSESSMENT & PLAN    # Myopic Degeneration with CNV OU    -s/p Avastin each eye    -No active hemorrhage noted    -right eye no signs of active CNVM; left eye subretinal fluid 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   -s/p avastin left eye; last injection 1/2/23     -SRF almost resolved at first post cataract surgery retina examination   - I still think SRF and IRF (schisis) is mostly likely due to myopic degeneration (over  posterior staphyloma area) that fluctuates naturally not myopic CNVM: observe with no injections at this time   - RTC 8-9 months for DFE and OCT     # PVD OU    -Signs and symptoms of RD/RT discussed 08/10/22    # DM Type II with mild NPDR right eye no signs of DR left eye   -Last A1c 6.0 on 10/2022   -Good BP and blood glucose control recommended    # High Myopia OU   - peripheral examination unremarkable   - 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 Hernandez; observe   - Retina detachment precautions were discussed with the patient (presence or increased in flashes, floaters or a curtain in the visual field) and was asked to return if any of the those occur    return to clinic: 9-12 months, OCT each eye (30 and 55 degrees) and optos each eye     Complete documentation of historical and exam elements from today's encounter can be found in the full encounter summary report (not reduplicated in this progress note). I personally obtained the chief complaint(s) and history of present illness.  I confirmed and edited as necessary the review of systems, past medical/surgical history, family history, social history, and examination findings as documented by others; and I examined the patient myself. I personally reviewed the relevant tests, images, and reports as documented above. I formulated and edited as necessary the assessment and plan and discussed the findings and management plan with the patient and family.     Solo Junior MD

## 2023-06-07 ASSESSMENT — ENCOUNTER SYMPTOMS
MUSCLE CRAMPS: 0
ARTHRALGIAS: 1
INSOMNIA: 0
DECREASED CONCENTRATION: 0
DEPRESSION: 1
MYALGIAS: 1
BACK PAIN: 0
PANIC: 1
MUSCLE WEAKNESS: 0
JOINT SWELLING: 1
STIFFNESS: 1
NERVOUS/ANXIOUS: 1
NECK PAIN: 0

## 2023-06-07 ASSESSMENT — ACTIVITIES OF DAILY LIVING (ADL)
IN_THE_PAST_7_DAYS,_DID_YOU_NEED_HELP_FROM_OTHERS_TO_PERFORM_EVERYDAY_ACTIVITIES_SUCH_AS_EATING,_GETTING_DRESSED,_GROOMING,_BATHING,_WALKING,_OR_USING_THE_TOILET: N
IN_THE_PAST_7_DAYS,_DID_YOU_NEED_HELP_FROM_OTHERS_TO_TAKE_CARE_OF_THINGS_SUCH_AS_LAUNDRY_AND_HOUSEKEEPING,_BANKING,_SHOPPING,_USING_THE_TELEPHONE,_FOOD_PREPARATION,_TRANSPORTATION,_OR_TAKING_YOUR_OWN_MEDICATIONS?: N

## 2023-06-13 NOTE — PROGRESS NOTES
"SUBJECTIVE:   Yuko Blanchard is a 67 year old female who presents for Preventive Visit./Medicare Wellness       Patient has been advised of split billing requirements and indicates understanding: Yes  Are you in the first 12 months of your Medicare Part B coverage?  No    Physical Health:    In general, how would you rate your overall physical health? good    Outside of work, how many days during the week do you exercise? 6-7 days/week    Outside of work, approximately how many minutes a day do you exercise?greater than 60 minutes    If you drink alcohol do you typically have >3 drinks per day or >7 drinks per week? No    Do you usually eat at least 4 servings of fruit and vegetables a day, include whole grains & fiber and avoid regularly eating high fat or \"junk\" foods? Yes    Do you have any problems taking medications regularly?  No    Do you have any side effects from medications? none    Needs assistance for the following daily activities: no assistance needed    Which of the following safety concerns are present in your home?  none identified     Hearing impairment: No    In the past 6 months, have you been bothered by leaking of urine? no    Mental Health:    In general, how would you rate your overall mental or emotional health? fair  PHQ-2 Score:  PHQ9=7  TERRA =8    Do you feel safe in your environment? Yes    Have you ever done Advance Care Planning? (For example, a Health Directive, POLST, or a discussion with a medical provider or your loved ones about your wishes): No, advance care planning information given to patient to review.  Patient plans to discuss their wishes with loved ones or provider.      Additional concerns to address?      Fall risk:  none  No ER visits   Cognitive Screenin) Repeat 3 items (Leader, Season, Table)    2) Clock draw: NORMAL  3) 3 item recall: Recalls 3 objects  Results: 5    Mini-CogTM Copyright S Izabel. Licensed by the author for use in [facility]; " reprinted with permission (rei@.[email or web address]). All rights reserved.      Do you have sleep apnea, excessive snoring or daytime drowsiness?: no        PROBLEMS TO ADD ON...  Gyn:  PM  She is [id number] -  No vaginal bleeding - pap's have been normal - last one  and normal   Mammogram 2022    has had type 2 diabetes for about 4 yrs.  On metformin and glipizide.   Does bs testing bid once/wk:  fasting and 2 hrs pp.  Fasting sugars run around  103 and 108 and 2 hr pp 80-90.   one  periods of low sugar.  A1C  was 6.0 in Oct 2022   Eye exam:  2023   Has mild NPDR right eye  - had cataract surgery in 2023 - told to return in 9 months  -  Foot exam - no foot sores - no foot numbness 2022  Microalbuminuria - 10/2022 and negative  Flu shot yes        HTN - no hx of HTN - no meds - no home cuff         Lipids: not on statin - 10/2022  The 10-year ASCVD risk score (Louie RICARDO, et al., 2019) is: 15.9%    Values used to calculate the score:      Age: 67 years      Sex: Female      Is Non- : No      Diabetic: Yes      Tobacco smoker: No      Systolic Blood Pressure: 133 mmHg      Is BP treated: No      HDL Cholesterol: 40 mg/dL      Total Cholesterol: 175 mg/dL  Changed diet and exercise and wants to stay off med if possible      Anxiety - got worse after retired - tries to do exercise and getting out - does get panic attacks - heart races and gets afraid of travelling or going outside  - TERRA =8; PHQ9= 8    Osteopenia: DXA  Left femoral neck  = -1.8    Health Maintenance  Colonoscopy:10/2022 - polyps and told repeat 5 yrs  Shingrix  Yes  Mammogram - 2022  DXA 2022    Reviewed and updated as needed this visit by clinical staff                  Reviewed and updated as needed this visit by Provider                 Social History     Tobacco Use     Smoking status: Former     Types: Cigarettes     Start date:      Quit date:      Years since quittin.4     Smokeless tobacco: Never    Vaping Use     Vaping status: Not on file   Substance Use Topics     Alcohol use: Not on file       Do you have a current opioid prescription? No  Do you use any other controlled substances or medications that are not prescribed by a provider? None                      Current providers sharing in care for this patient include:   Patient Care Team:  Libby Black MD PhD as PCP - General  Solo Tatum MD as MD (Ophthalmology)  Libby Black MD PhD as Assigned PCP  Solo Tatum MD as Assigned Surgical Provider    The following health maintenance items are reviewed in Epic and correct as of today:  Health Maintenance   Topic Date Due     ADVANCE CARE PLANNING  Never done     HEPATITIS C SCREENING  Never done     FALL RISK ASSESSMENT  Never done     MEDICARE ANNUAL WELLNESS VISIT  09/11/2021     Pneumococcal Vaccine: 65+ Years (2 - PCV) 09/11/2021     PHQ-2 (once per calendar year)  01/01/2023     A1C  01/20/2023     COVID-19 Vaccine (6 - Moderna series) 01/27/2023     BMP  10/20/2023     LIPID  10/20/2023     MICROALBUMIN  10/20/2023     DIABETIC FOOT EXAM  12/14/2023     EYE EXAM  05/23/2024     MAMMO SCREENING  09/09/2024     COLORECTAL CANCER SCREENING  10/13/2027     DTAP/TDAP/TD IMMUNIZATION (3 - Td or Tdap) 05/10/2033     DEXA  09/09/2037     INFLUENZA VACCINE  Completed     ZOSTER IMMUNIZATION  Completed     IPV IMMUNIZATION  Aged Out     MENINGITIS IMMUNIZATION  Aged Out     Lab work is in process  Labs reviewed in EPIC  BP Readings from Last 3 Encounters:   06/14/23 111/80   01/09/23 129/75   01/02/23 127/78    Wt Readings from Last 3 Encounters:   06/14/23 73.6 kg (162 lb 4.8 oz)   01/09/23 71.7 kg (158 lb)   01/02/23 71.7 kg (158 lb)                  Patient Active Problem List   Diagnosis     Combined form of age-related cataract, both eyes     Past Surgical History:   Procedure Laterality Date     CATARACT IOL, RT/LT       COLONOSCOPY      hx of polyps      COLONOSCOPY N/A 10/13/2022    Procedure: COLONOSCOPY, WITH POLYPECTOMY;  Surgeon: Dahlia Holley MD;  Location: [facility] OR     HAND SURGERY Left     lost few fingertips     PHACOEMULSIFICATION WITH STANDARD INTRAOCULAR LENS IMPLANT Left 2023    Procedure: PHACOEMULSIFICATION, CATARACT, WITH STANDARD INTRAOCULAR LENS IMPLANT INSERTION-LEFT EYE;  Surgeon: Ap Hernandez MD;  Location: [facility] OR     PHACOEMULSIFICATION WITH STANDARD INTRAOCULAR LENS IMPLANT Right 2023    Procedure: PHACOEMULSIFICATION, CATARACT, WITH STANDARD INTRAOCULAR LENS IMPLANT INSERTION-RIGHT EYE;  Surgeon: Ap Hernandez MD;  Location: [facility] OR     wisdom teeth         Social History     Tobacco Use     Smoking status: Former     Types: Cigarettes     Start date:      Quit date:      Years since quittin.4     Smokeless tobacco: Never   Vaping Use     Vaping status: Not on file   Substance Use Topics     Alcohol use: Not on file     Family History   Problem Relation Age of Onset     Macular Degeneration Mother      Macular Degeneration Maternal Grandmother      Macular Degeneration Other      Macular Degeneration Other      Glaucoma No family hx of          Current Outpatient Medications   Medication Sig Dispense Refill     fluticasone (FLONASE) 50 MCG/ACT nasal spray Spray 1 spray into both nostrils daily       glipiZIDE (GLUCOTROL XL) 5 MG 24 hr tablet Take 1 tablet (5 mg) by mouth daily 90 tablet 3     ketotifen (ZADITOR) 0.025 % ophthalmic solution 1 drop 2 times daily       metFORMIN (GLUCOPHAGE) 500 MG tablet Take 2 tablets (1,000 mg) by mouth 2 times daily (with meals) 360 tablet 3     ONETOUCH ULTRA test strip        No Known Allergies  Recent Labs   Lab Test 10/20/22  0906   A1C 6.0*   LDL 97   HDL 40*   TRIG 191*   CR 0.61   GFRESTIMATED >90   POTASSIUM 4.4      Immunizations - UTD     Family History   Problem Relation Age of Onset     Macular Degeneration Mother      Macular Degeneration Maternal  "Grandmother      Macular Degeneration Other      Macular Degeneration Other      Glaucoma No family hx of          ROS:  Answers for HPI/ROS submitted by the patient on 6/7/2023  General Symptoms: No  Skin Symptoms: No  HENT Symptoms: No  EYE SYMPTOMS: No  HEART SYMPTOMS: No  LUNG SYMPTOMS: No  INTESTINAL SYMPTOMS: No  URINARY SYMPTOMS: No  GYNECOLOGIC SYMPTOMS: No  BREAST SYMPTOMS: No  SKELETAL SYMPTOMS: Yes  BLOOD SYMPTOMS: No  NERVOUS SYSTEM SYMPTOMS: No  MENTAL HEALTH SYMPTOMS: Yes  Back pain: No  Muscle aches: Yes  Neck pain: No  Swollen joints: Yes  Joint pain: Yes  Bone pain: No  Muscle cramps: No  Muscle weakness: No  Joint stiffness: Yes  Bone fracture: No  Nervous or Anxious: Yes  Depression: Yes  Trouble sleeping: No  Trouble thinking or concentrating: No  Mood changes: No  Panic attacks: Yes          OBJECTIVE:   /80 (BP Location: Right arm, Patient Position: Sitting, Cuff Size: Adult Regular)   Pulse 92   Ht 1.645 m (5' 4.76\")   Wt 73.6 kg (162 lb 4.8 oz)   SpO2 93%   BMI 27.21 kg/m   Estimated body mass index is 26.5 kg/m  as calculated from the following:    Height as of 1/9/23: 1.645 m (5' 4.75\").    Weight as of 1/9/23: 71.7 kg (158 lb).  EXAM:   GENERAL: healthy, alert and no distress  EYES: Eyes grossly normal to inspection, PERRL and conjunctivae and sclerae normal  HENT: ear canals and TM's normal, nose and mouth without ulcers or lesions  NECK: no adenopathy, no asymmetry, masses, or scars and thyroid normal to palpation  RESP: lungs clear to auscultation - no rales, rhonchi or wheezes  BREAST: normal without masses, tenderness or nipple discharge and no palpable axillary masses or adenopathy  CV: regular rate and rhythm, normal S1 S2, no S3 or S4, no murmur, click or rub, no peripheral edema   ABDOMEN: soft, nontender, no hepatosplenomegaly, no masses and bowel sounds normal  MS: no gross musculoskeletal defects noted, no edema  SKIN: no suspicious lesions or rashes  NEURO: Normal " "strength and tone, mentation intact and speech normal  PSYCH: mentation appears normal, affect normal/bright    Labs reviewed in Epic    ASSESSMENT / PLAN:       ICD-10-CM    1. 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 History of colonic polyps  Z86.010 F/u om 2027       3. Anxiety  F41.9       4. Encounter for screening mammogram for breast cancer  Z12.31 MA Screen Bilateral w/John      5. Medicare annual wellness visit, subsequent  Z00.00 immuniz UTD; colonoscopy in 2027; mammogram in 9/2023  DXA in 0/2024  Consider pneumo #20       6. Screening for hypothyroidism  Z13.29 TSH with free T4 reflex      7. Screening for deficiency anemia  Z13.0 Hemoglobin      8. Screening for hyperlipidemia  Z13.220 Lipid panel reflex to direct LDL Fasting   9.      Osteopenia  No meds - DXA in 9/2024  Discussed calcium and vit D            COUNSELING:  Reviewed preventive health counseling, as reflected in patient instructions       Regular exercise       Healthy diet/nutrition       Vision screening       Hearing screening       Dental care       Fall risk prevention       Osteoporosis prevention/bone health       Colon cancer screening       Advanced Planning     Estimated body mass index is 26.5 kg/m  as calculated from the following:    Height as of 1/9/23: 1.645 m (5' 4.75\").    Weight as of 1/9/23: 71.7 kg (158 lb).        She reports that she quit smoking about 31 years ago. Her smoking use included cigarettes. She started smoking about 43 years ago. She has never used smokeless tobacco.      Appropriate preventive services were discussed with this patient, including applicable screening as appropriate for cardiovascular disease, diabetes, osteopenia/osteoporosis, and glaucoma.  As appropriate for age/gender, discussed screening for colorectal " cancer, prostate cancer, breast cancer, and cervical cancer. Checklist reviewing preventive services available has been given to the patient.    Reviewed patients plan of care and provided an AVS. The Basic Care Plan (routine screening as documented in Health Maintenance) for Yuko meets the Care Plan requirement. This Care Plan has been established and reviewed with the Patient.    Counseling Resources:  ATP IV Guidelines  Pooled Cohorts Equation Calculator  Breast Cancer Risk Calculator  BRCA-Related Cancer Risk Assessment: FHS-7 Tool  FRAX Risk Assessment  ICSI Preventive Guidelines  Dietary Guidelines for Americans, 2010  USDA's MyPlate  ASA Prophylaxis  Lung CA Screening    Libby Black MD PhD  [facility] [place]

## 2023-06-14 ENCOUNTER — OFFICE VISIT (OUTPATIENT)
Dept: FAMILY MEDICINE | Facility: CLINIC | Age: 68
End: 2023-06-14
Payer: COMMERCIAL

## 2023-06-14 VITALS
WEIGHT: 162.3 LBS | OXYGEN SATURATION: 93 % | HEART RATE: 92 BPM | BODY MASS INDEX: 27.04 KG/M2 | HEIGHT: 65 IN | SYSTOLIC BLOOD PRESSURE: 111 MMHG | DIASTOLIC BLOOD PRESSURE: 80 MMHG

## 2023-06-14 DIAGNOSIS — Z13.0 SCREENING FOR DEFICIENCY ANEMIA: ICD-10-CM

## 2023-06-14 DIAGNOSIS — E11.3291: Primary | ICD-10-CM

## 2023-06-14 DIAGNOSIS — Z13.29 SCREENING FOR HYPOTHYROIDISM: ICD-10-CM

## 2023-06-14 DIAGNOSIS — Z86.0100 HISTORY OF COLONIC POLYPS: ICD-10-CM

## 2023-06-14 DIAGNOSIS — Z00.00 MEDICARE ANNUAL WELLNESS VISIT, SUBSEQUENT: ICD-10-CM

## 2023-06-14 DIAGNOSIS — Z12.31 ENCOUNTER FOR SCREENING MAMMOGRAM FOR BREAST CANCER: ICD-10-CM

## 2023-06-14 DIAGNOSIS — F41.9 ANXIETY: ICD-10-CM

## 2023-06-14 DIAGNOSIS — Z13.220 SCREENING FOR HYPERLIPIDEMIA: ICD-10-CM

## 2023-06-14 PROCEDURE — G0439 PPPS, SUBSEQ VISIT: HCPCS | Performed by: FAMILY MEDICINE

## 2023-06-14 ASSESSMENT — ANXIETY QUESTIONNAIRES
GAD7 TOTAL SCORE: 8
6. BECOMING EASILY ANNOYED OR IRRITABLE: NOT AT ALL
GAD7 TOTAL SCORE: 8
2. NOT BEING ABLE TO STOP OR CONTROL WORRYING: SEVERAL DAYS
1. FEELING NERVOUS, ANXIOUS, OR ON EDGE: MORE THAN HALF THE DAYS
7. FEELING AFRAID AS IF SOMETHING AWFUL MIGHT HAPPEN: NEARLY EVERY DAY
3. WORRYING TOO MUCH ABOUT DIFFERENT THINGS: MORE THAN HALF THE DAYS
5. BEING SO RESTLESS THAT IT IS HARD TO SIT STILL: NOT AT ALL
IF YOU CHECKED OFF ANY PROBLEMS ON THIS QUESTIONNAIRE, HOW DIFFICULT HAVE THESE PROBLEMS MADE IT FOR YOU TO DO YOUR WORK, TAKE CARE OF THINGS AT HOME, OR GET ALONG WITH OTHER PEOPLE: NOT DIFFICULT AT ALL

## 2023-06-14 ASSESSMENT — PATIENT HEALTH QUESTIONNAIRE - PHQ9
SUM OF ALL RESPONSES TO PHQ QUESTIONS 1-9: 7
5. POOR APPETITE OR OVEREATING: NOT AT ALL

## 2023-06-14 NOTE — PATIENT INSTRUCTIONS
Mammogram in Sept  Blood work when fasting  Colonoscopy in 2027  DXA in 9/2024  See me in 3  months for diabetes       Nutritious diet  Eat 1200 mg calcium total every day.  Many people achieve this by a diet containing calcium (milk, yogurt, cheese, and other dairy products, supplemented food) and 1 calcium pill. If you don't eat dairy, you should take a calcium supplement 2 times a day.  Eat 1000 IU of vitamin D on daily basis.    Eat a variety of fruits and vegetables and eat whole grains.  Try to choose foods with a high NuVal score, if your grocery store shows this number. High numbers, like 80 or 90, are nutritious foods, and low scores, like 10 are less nutritious foods.          Men should drink no more than 2 alcoholic beverages daily on average; women should drink no more than 1 alcoholic beverage daily on average.    Everyone should avoid all tobacco and nicotine-containing products.    Exercise: Aim for:  Moderate activity (where you can still talk while doing it, such as walking about 100 steps per minute, or 3,000 steps in 30 minutes) for 30 minutes at least 5 days a week  Or  Vigorous exercise (you are working so hard you are breathing hard and fast and your heart rate has gone up, such as playing basketball or soccer) at least 75 minutes weekly    If you have trouble doing 30 minutes of activity, all at once, try small bursts of activity. Go to www.abefitness.com for suggestions on how you can do this at home or work.     All adults should try to do muscle strengthening exercise at least 2 days a week    Safety  Always wear bike/motorcycle helmet and seatbelt in a vehicle  Make sure your home has smoke and carbon monoxide detectors.  Wear broad spectrum sunscreen of at least SPF 15 on sun-exposed skin.    Preventing disease  See your dentist at least once a year  Have your eyes checked every 1-2 years.  Get a flu shot each year.

## 2023-06-14 NOTE — PROGRESS NOTES
Medicare Annual Wellness Questionnaire:  This 67 year old year old female presents for a Medicare Wellness Exam.    Patient Care Team       Relationship Specialty Notifications Start End    Libby Black MD PhD PCP - General   6/7/23     Phone: [phone] Fax: [phone] [address] [facility] [address]    Solo Tatum MD MD Ophthalmology  7/19/22     Phone: [phone] Fax: [phone] [address] [facility] [address]    Libby Black MD PhD Assigned PCP   7/27/22     Phone: [phone] Fax: [phone] [address] [facility] [address]    Solo Tatum MD Assigned Surgical Provider   8/20/22     Phone: [phone] Fax: [phone] [address] [facility] [address]          Fall Risk Assessment:  Have you fallen 2 or more times in the last year?  No    How many times were you injured due to a fall in the last year?  0    PHQ-2:  Over the last 2 weeks, how often have you been bothered by feeling down, depressed, or hopeless?  Several Days (1)     Over the last 2 weeks, how often have you had little interest or pleasure in doing things?  Several Days (1)     Social History:  What is your marital status?  Single    Who lives in your household?  Just me, but in senior building    Does your home have loose rugs in the hallway:     No    Does your home have grab bars in the bathroom:    Yes     Does your home have handrails on the stairs?  Yes     Does your home have poorly lit areas?    No    Do you feel threatened or controlled by a partner, ex-partner or anyone in your life?   No    Has anyone hurt you physically, for example by pushing, hitting, slapping or kicking you or forcing you to have sex?   No    Do you need help with the phone, transportation, shopping, preparing meals, housework, laundry, medications or managing money?   No    Sexual Health:  Are you sexually active?    No    If yes, with men,  women, or both? N/A    If yes, how many partners?  N/A    If yes, are you using condoms?    N/A    Have you had any sexually transmitted infections in the last year?   No    Do you have any sexual concerns?    No    Women Only:  Women: What year did you stop having periods (approximate age)?  2015? This is a guess    Women: Any vaginal bleeding in the last year?    No    Women: Have you ever had an abnormal Pap smear?    No    General Health Assessment:  Have you noticed any hearing difficulties?   No    Do you wear hearing aids?   No    Have you seen a hearing professional such as an audiologist in the last 1 year?   No    Do you have vision difficulty?    Yes     Do you wear glasses or contacts?   Yes     Have you seen an eye doctor in the last 1 year?   Yes     How many servings of fruits and vegetables do you eat a day?  Fruit: 2 - 3  Vegetables: 5 - 7    How often do you exercise in a week?  everyday    How long and what kind of exercise do you do?  Walk for an hour at least    Tobacco and Alcohol History:  Do you use tobacco/nicotine products?    No    If yes, please list the method of use and average weekly consumption?  N/A    Do you use any other drugs?   No         Do you drink alcohol?   Yes     If you drink alcohol, how many drinks per week?  1-2    Advance Directive:  Have you completed an Advance Directives document?  No    If yes, have you given a copy to the clinic?   No    Do you need information on Advance Directives?   No    Gian Caruso, EMT at 5:11 PM on 6/14/2023    Answers for HPI/ROS submitted by the patient on 6/7/2023  General Symptoms: No  Skin Symptoms: No  HENT Symptoms: No  EYE SYMPTOMS: No  HEART SYMPTOMS: No  LUNG SYMPTOMS: No  INTESTINAL SYMPTOMS: No  URINARY SYMPTOMS: No  GYNECOLOGIC SYMPTOMS: No  BREAST SYMPTOMS: No  SKELETAL SYMPTOMS: Yes  BLOOD SYMPTOMS: No  NERVOUS SYSTEM SYMPTOMS: No  MENTAL HEALTH SYMPTOMS: Yes  Back pain: No  Muscle aches: Yes  Neck pain: No  Swollen joints:  Yes  Joint pain: Yes  Bone pain: No  Muscle cramps: No  Muscle weakness: No  Joint stiffness: Yes  Bone fracture: No  Nervous or Anxious: Yes  Depression: Yes  Trouble sleeping: No  Trouble thinking or concentrating: No  Mood changes: No  Panic attacks: Yes

## 2023-06-14 NOTE — NURSING NOTE
"Yuko Blanchard is a 67 year old female patient that presents today in clinic for the following:    Chief Complaint   Patient presents with     Physical     Medicare Visit     The patient's allergies and medications were reviewed as noted. A set of vitals were recorded as noted without incident: /80 (BP Location: Right arm, Patient Position: Sitting, Cuff Size: Adult Regular)   Pulse 92   Ht 1.645 m (5' 4.76\")   Wt 73.6 kg (162 lb 4.8 oz)   SpO2 93%   BMI 27.21 kg/m  . The patient does not have any other questions for the provider.    Gian Caruso, EMT 2:05 PM on 6/14/2023   "

## 2023-08-26 ENCOUNTER — HEALTH MAINTENANCE LETTER (OUTPATIENT)
Age: 68
End: 2023-08-26

## 2023-09-07 ENCOUNTER — LAB (OUTPATIENT)
Dept: LAB | Facility: CLINIC | Age: 68
End: 2023-09-07
Payer: COMMERCIAL

## 2023-09-07 DIAGNOSIS — Z13.220 SCREENING FOR HYPERLIPIDEMIA: ICD-10-CM

## 2023-09-07 DIAGNOSIS — Z13.0 SCREENING FOR DEFICIENCY ANEMIA: ICD-10-CM

## 2023-09-07 DIAGNOSIS — Z13.29 SCREENING FOR HYPOTHYROIDISM: ICD-10-CM

## 2023-09-07 DIAGNOSIS — E11.3291: ICD-10-CM

## 2023-09-07 LAB
CHOLEST SERPL-MCNC: 181 MG/DL
HBA1C MFR BLD: 6.1 %
HDLC SERPL-MCNC: 46 MG/DL
HGB BLD-MCNC: 15.3 G/DL (ref 11.7–15.7)
LDLC SERPL CALC-MCNC: 105 MG/DL
NONHDLC SERPL-MCNC: 135 MG/DL
TRIGL SERPL-MCNC: 149 MG/DL
TSH SERPL DL<=0.005 MIU/L-ACNC: 3.59 UIU/ML (ref 0.3–4.2)

## 2023-09-07 PROCEDURE — 85018 HEMOGLOBIN: CPT | Performed by: PATHOLOGY

## 2023-09-07 PROCEDURE — 36415 COLL VENOUS BLD VENIPUNCTURE: CPT | Performed by: PATHOLOGY

## 2023-09-07 PROCEDURE — 80061 LIPID PANEL: CPT | Performed by: PATHOLOGY

## 2023-09-07 PROCEDURE — 83036 HEMOGLOBIN GLYCOSYLATED A1C: CPT | Performed by: FAMILY MEDICINE

## 2023-09-07 PROCEDURE — 99000 SPECIMEN HANDLING OFFICE-LAB: CPT | Performed by: PATHOLOGY

## 2023-09-07 PROCEDURE — 84443 ASSAY THYROID STIM HORMONE: CPT | Performed by: PATHOLOGY

## 2023-09-12 NOTE — PROGRESS NOTES
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, macular edema presence unspecified, unspecified whether long term insulin use (H)  Good control - on meds metformin and glipizide  - foot exam is normal today; can decrease monitoring to a few times/month  - eye exam due in 5/2024  - foot exam is normal   - Albumin Random Urine Quantitative with Creat Ratio  - Albumin Random Urine Quantitative with Creat Ratio    Screening for hyperlipidemia  LDL is not at goal - LDL = 105  discussed starting a statin - will start lipitor -   - atorvastatin (LIPITOR) 10 MG tablet  Dispense: 90 tablet; Refill: 3  - Lipid panel reflex to direct LDL Fasting check at 6 months    Encounter for immunization  Nees high dose   - INFLUENZA VACCINE 65+ (FLUZONE HD)        Return in about 6 months (around 3/13/2024) for Routine Visit.    Libby Black MD PhD  [facility] [place]    PLAN  Blood work 6 months : lipid and A1C  Urine sample today  Decrease monitoring to few times/month  Flu shot today   See me in 6 months - March 2024  Physical next Sept 2024       Time note (e4, 30'): The total of my time (on the date of service) for this service was 32 minutes, including discussion/face-to-face, chart review, interpretation not otherwise reported, documentation, and updating of the computerized record.      Dorcas Huntley is a 67 year old, presenting for the following health issues:  F/up diabetes    HPI   DM 2  has had type 2 diabetes for about 5 yrs.  On metformin and glipizide. No  problems with the meds. Does bs testing bid once/wk:  fasting (low 100's)  and 2 hrs pp.(90's).  rare periods of low sugar.  A1C  was 6.1 in 9/2023.   Eye exam:  5/2023   Has mild NPDR right eye  - had cataract surgery in Jan 2023 - told to return in 9 months  -  Foot exam - no foot sores - no foot numbness  due for exam    Microalbuminuria - 10/2022 and negative - due   Flu  "shot  - today   No ASA      HTN   - no meds - blood pressure at goal      Hyperlipidemia   - no statin  LDL was 105 (9/2023)  The 10-year ASCVD risk score (Louie DK, et al., 2019) is: 13.7%    Values used to calculate the score:      Age: 67 years      Sex: Female      Is Non- : No      Diabetic: Yes      Tobacco smoker: No      Systolic Blood Pressure: 124 mmHg      Is BP treated: No      HDL Cholesterol: 46 mg/dL      Total Cholesterol: 181 mg/dL    Anxiety - more anxious since retired  - TERRA = 2  PHQ9=4           Review of Systems reports joint pain , allergies and recent anxiety, and the rest of the complete ROS is negative other than as mentioned above.          Objective    /78 (BP Location: Right arm, Patient Position: Sitting, Cuff Size: Adult Regular)   Pulse 105   Ht 1.645 m (5' 4.76\")   Wt 74.9 kg (165 lb 1.6 oz)   SpO2 92%   BMI 27.68 kg/m    Body mass index is 27.68 kg/m .  Physical Exam   GENERAL: healthy, alert and no distress  NECK: no adenopathy, no asymmetry, masses, or scars and thyroid normal to palpation  RESP: lungs clear to auscultation - no rales, rhonchi or wheezes  CV: regular rate and rhythm, normal S1 S2, no S3 or S4, no murmur, click or rub, no peripheral edema and peripheral pulses strong  MS: no gross musculoskeletal defects noted, no edema  SKIN: no suspicious lesions or rashes  NEURO: Normal strength and tone, mentation intact and speech normal  PSYCH: mentation appears normal, affect normal/bright  LYMPH: no cervical, supraclavicular, adenopathy  Diabetic foot exam: normal DP and PT pulses, no trophic changes or ulcerative lesions, and normal sensory exam      Libby Black MD, PhD                  "

## 2023-09-13 ENCOUNTER — OFFICE VISIT (OUTPATIENT)
Dept: FAMILY MEDICINE | Facility: CLINIC | Age: 68
End: 2023-09-13
Payer: COMMERCIAL

## 2023-09-13 ENCOUNTER — LAB (OUTPATIENT)
Dept: LAB | Facility: CLINIC | Age: 68
End: 2023-09-13
Payer: COMMERCIAL

## 2023-09-13 VITALS
BODY MASS INDEX: 27.51 KG/M2 | OXYGEN SATURATION: 92 % | WEIGHT: 165.1 LBS | HEIGHT: 65 IN | SYSTOLIC BLOOD PRESSURE: 124 MMHG | DIASTOLIC BLOOD PRESSURE: 78 MMHG | HEART RATE: 105 BPM

## 2023-09-13 DIAGNOSIS — E11.3291: ICD-10-CM

## 2023-09-13 DIAGNOSIS — E11.3291: Primary | ICD-10-CM

## 2023-09-13 DIAGNOSIS — Z23 ENCOUNTER FOR IMMUNIZATION: ICD-10-CM

## 2023-09-13 DIAGNOSIS — Z13.220 SCREENING FOR HYPERLIPIDEMIA: ICD-10-CM

## 2023-09-13 LAB
CREAT UR-MCNC: 102 MG/DL
MICROALBUMIN UR-MCNC: <12 MG/L
MICROALBUMIN/CREAT UR: NORMAL MG/G{CREAT}

## 2023-09-13 PROCEDURE — 82570 ASSAY OF URINE CREATININE: CPT | Performed by: FAMILY MEDICINE

## 2023-09-13 PROCEDURE — 99000 SPECIMEN HANDLING OFFICE-LAB: CPT | Performed by: PATHOLOGY

## 2023-09-13 PROCEDURE — G0008 ADMIN INFLUENZA VIRUS VAC: HCPCS | Performed by: FAMILY MEDICINE

## 2023-09-13 PROCEDURE — 99214 OFFICE O/P EST MOD 30 MIN: CPT | Mod: 25 | Performed by: FAMILY MEDICINE

## 2023-09-13 PROCEDURE — 90662 IIV NO PRSV INCREASED AG IM: CPT | Performed by: FAMILY MEDICINE

## 2023-09-13 RX ORDER — ATORVASTATIN CALCIUM 10 MG/1
10 TABLET, FILM COATED ORAL DAILY
Qty: 90 TABLET | Refills: 3 | Status: SHIPPED | OUTPATIENT
Start: 2023-09-13 | End: 2024-08-26

## 2023-09-13 ASSESSMENT — PATIENT HEALTH QUESTIONNAIRE - PHQ9
SUM OF ALL RESPONSES TO PHQ QUESTIONS 1-9: 3
5. POOR APPETITE OR OVEREATING: NOT AT ALL

## 2023-09-13 ASSESSMENT — ANXIETY QUESTIONNAIRES
2. NOT BEING ABLE TO STOP OR CONTROL WORRYING: NOT AT ALL
3. WORRYING TOO MUCH ABOUT DIFFERENT THINGS: NOT AT ALL
1. FEELING NERVOUS, ANXIOUS, OR ON EDGE: SEVERAL DAYS
IF YOU CHECKED OFF ANY PROBLEMS ON THIS QUESTIONNAIRE, HOW DIFFICULT HAVE THESE PROBLEMS MADE IT FOR YOU TO DO YOUR WORK, TAKE CARE OF THINGS AT HOME, OR GET ALONG WITH OTHER PEOPLE: SOMEWHAT DIFFICULT
7. FEELING AFRAID AS IF SOMETHING AWFUL MIGHT HAPPEN: SEVERAL DAYS
6. BECOMING EASILY ANNOYED OR IRRITABLE: NOT AT ALL
GAD7 TOTAL SCORE: 2
GAD7 TOTAL SCORE: 2
5. BEING SO RESTLESS THAT IT IS HARD TO SIT STILL: NOT AT ALL

## 2023-09-13 ASSESSMENT — PAIN SCALES - GENERAL: PAINLEVEL: MODERATE PAIN (4)

## 2023-09-13 NOTE — NURSING NOTE
Yuko Blanchard is a 67 year old female that presents in clinic today for the following:     Chief Complaint   Patient presents with    Follow Up     Pt here for follow up        The patient's allergies and medications were reviewed. The patient's vitals were obtained without incident. The patient does not have any other questions for the provider.     Zonia Malone, EMT at 1:16 PM on 9/13/2023.  Primary Care Clinic: [phone]

## 2023-09-13 NOTE — PATIENT INSTRUCTIONS
Blood work 6 months : lipid and A1C  Urine sample today  Decrease monitoring to few times/month  Flu shot today   See me in 6 months - March 2024  Physical next Sept 2024

## 2023-09-21 ENCOUNTER — ANCILLARY PROCEDURE (OUTPATIENT)
Dept: MAMMOGRAPHY | Facility: CLINIC | Age: 68
End: 2023-09-21
Attending: FAMILY MEDICINE
Payer: COMMERCIAL

## 2023-09-21 DIAGNOSIS — Z12.31 ENCOUNTER FOR SCREENING MAMMOGRAM FOR BREAST CANCER: ICD-10-CM

## 2023-09-21 PROCEDURE — 77063 BREAST TOMOSYNTHESIS BI: CPT | Mod: GC | Performed by: RADIOLOGY

## 2023-09-21 PROCEDURE — 77067 SCR MAMMO BI INCL CAD: CPT | Mod: GC | Performed by: RADIOLOGY

## 2023-10-03 ENCOUNTER — ANCILLARY PROCEDURE (OUTPATIENT)
Dept: MAMMOGRAPHY | Facility: CLINIC | Age: 68
End: 2023-10-03
Attending: FAMILY MEDICINE
Payer: COMMERCIAL

## 2023-10-03 DIAGNOSIS — R92.8 ABNORMAL MAMMOGRAM OF RIGHT BREAST: ICD-10-CM

## 2023-10-03 PROCEDURE — 77065 DX MAMMO INCL CAD UNI: CPT | Mod: RT | Performed by: RADIOLOGY

## 2023-10-03 PROCEDURE — 76642 ULTRASOUND BREAST LIMITED: CPT | Mod: RT | Performed by: RADIOLOGY

## 2023-10-03 PROCEDURE — G0279 TOMOSYNTHESIS, MAMMO: HCPCS | Performed by: RADIOLOGY

## 2023-11-27 ENCOUNTER — TELEPHONE (OUTPATIENT)
Dept: FAMILY MEDICINE | Facility: CLINIC | Age: 68
End: 2023-11-27
Payer: COMMERCIAL

## 2023-11-27 DIAGNOSIS — E11.3291: ICD-10-CM

## 2023-11-30 RX ORDER — GLIPIZIDE 5 MG/1
5 TABLET, FILM COATED, EXTENDED RELEASE ORAL DAILY
Qty: 90 TABLET | Refills: 3 | Status: SHIPPED | OUTPATIENT
Start: 2023-11-30

## 2023-11-30 NOTE — TELEPHONE ENCOUNTER
[facility] Call Center    Phone Message    May a detailed message be left on voicemail: yes     Reason for Call: Medication Question or concern regarding medication   Prescription Clarification  Name of Medication:   glipiZIDE (GLUCOTROL XL) 5 MG 24 hr tablet  metFORMIN (GLUCOPHAGE) 500 MG tablet    Prescribing Provider: Libby Black MD PhD       Pharmacy:   [facility] [address]      What on the order needs clarification?   The patient was calling to see if the refills was completed, she will be running out of the Metformin in the next couple of days, please follow up thank you.        Action Taken: Message routed to:  [facility] ([facility] pcc    Travel Screening: Not Applicable

## 2024-01-13 ENCOUNTER — HEALTH MAINTENANCE LETTER (OUTPATIENT)
Age: 69
End: 2024-01-13

## 2024-03-04 ENCOUNTER — PATIENT OUTREACH (OUTPATIENT)
Dept: GASTROENTEROLOGY | Facility: CLINIC | Age: 69
End: 2024-03-04
Payer: COMMERCIAL

## 2024-03-07 ENCOUNTER — LAB (OUTPATIENT)
Dept: LAB | Facility: CLINIC | Age: 69
End: 2024-03-07
Payer: COMMERCIAL

## 2024-03-07 DIAGNOSIS — Z13.220 SCREENING FOR HYPERLIPIDEMIA: ICD-10-CM

## 2024-03-07 LAB
CHOLEST SERPL-MCNC: 114 MG/DL
FASTING STATUS PATIENT QL REPORTED: ABNORMAL
HDLC SERPL-MCNC: 45 MG/DL
LDLC SERPL CALC-MCNC: 44 MG/DL
NONHDLC SERPL-MCNC: 69 MG/DL
TRIGL SERPL-MCNC: 126 MG/DL

## 2024-03-07 PROCEDURE — 80061 LIPID PANEL: CPT | Performed by: PATHOLOGY

## 2024-03-07 PROCEDURE — 36415 COLL VENOUS BLD VENIPUNCTURE: CPT | Performed by: PATHOLOGY

## 2024-03-12 NOTE — PROGRESS NOTES
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, macular edema presence unspecified, unspecified whether long term insulin use (H)   Good control - feet normal - monofilament exam done in Sept 2023, eye appt due in May, no ASA continue meds - not doing regular testing  - BASIC METABOLIC PANEL; Future  - HEMOGLOBIN A1C; Future    Encounter for immunization  Should get prevnar 20  - Pneumococcal 20 Valent Conjugate (PCV20)    Hyperlipidemia LDL goal <100  Started on a statin and LDL is 44 - continue with this             BMI  Estimated body mass index is 27.58 kg/m  as calculated from the following:    Height as of 9/13/23: 1.645 m (5' 4.76\").    Weight as of this encounter: 74.6 kg (164 lb 8 oz).             Return in about 6 months (around 9/13/2024) for Physical Exam.    Time note (e3, 20'): The total of my time (on the date of service) for this service was 20 minutes, including discussion/face-to-face, chart review, interpretation not otherwise reported, documentation, and updating of the computerized record.    Libby Black MD, PhD      Subjective   Yuko is a 68 year old, presenting for the following health issues:    HPI 67 yo female with diabetes, HTN and hyperlipidemia - comes in for 6 months f/unit(s)    DM 2  has type 2 diabetes for about 5 yrs.  On metformin and glipizide. No  problems with the medicines - was testing regularly and very consistent, now  only tests with symptoms  rare periods of low sugar.  A1C  was 6.1 in 9/2023.   Eye exam:  5/2023   Has mild NPDR right eye  - has macular degeneration - had cataract surgery in Jan 2023 - told to return in 9 months  - due in May   Foot exam  - no foot sores - no foot numbness  exam was 9/2023   Microalbuminuria - 11/2023 microalb was neg   No ASA      Elevated BP -on occasion has been up "   - no meds - blood pressure at goal today   - not doing home measurements   - never had      Hyperlipidemia   -started a statin -recent LDL =44              Review of Systems  Constitutional, HEENT, cardiovascular, pulmonary, GI, , musculoskeletal, neuro, skin, endocrine and psych systems are negative, except as otherwise noted.      Objective    /77 (BP Location: Right arm, Patient Position: Sitting, Cuff Size: Adult Regular)   Pulse 82   Wt 74.6 kg (164 lb 8 oz)   SpO2 94%   BMI 27.58 kg/m    Body mass index is 27.58 kg/m .  Physical Exam   GENERAL: alert and no distress  NECK: no adenopathy, no asymmetry, masses, or scars  RESP: lungs clear to auscultation - no rales, rhonchi or wheezes  CV: regular rate and rhythm, normal S1 S2, no S3 or S4, no murmur no peripheral edema  MS: no gross musculoskeletal defects noted, no edema  SKIN: no suspicious lesions or rashes  NEURO: Normal strength and tone, mentation intact and speech normal  PSYCH: mentation appears normal, affect normal/bright  LYMPH: no cervical, supraclavicular, adenopathy  Diabetic foot exam: normal DP and PT pulses, no trophic changes or ulcerative lesions, and normal sensory exam            Signed Electronically by: Libby Black MD PhD

## 2024-03-13 ENCOUNTER — OFFICE VISIT (OUTPATIENT)
Dept: FAMILY MEDICINE | Facility: CLINIC | Age: 69
End: 2024-03-13
Payer: COMMERCIAL

## 2024-03-13 ENCOUNTER — LAB (OUTPATIENT)
Dept: LAB | Facility: CLINIC | Age: 69
End: 2024-03-13
Payer: COMMERCIAL

## 2024-03-13 VITALS
HEART RATE: 82 BPM | OXYGEN SATURATION: 94 % | BODY MASS INDEX: 27.58 KG/M2 | WEIGHT: 164.5 LBS | DIASTOLIC BLOOD PRESSURE: 77 MMHG | SYSTOLIC BLOOD PRESSURE: 125 MMHG

## 2024-03-13 DIAGNOSIS — Z11.59 NEED FOR HEPATITIS C SCREENING TEST: ICD-10-CM

## 2024-03-13 DIAGNOSIS — Z23 ENCOUNTER FOR IMMUNIZATION: ICD-10-CM

## 2024-03-13 DIAGNOSIS — E11.3291: Primary | ICD-10-CM

## 2024-03-13 DIAGNOSIS — E11.3291: ICD-10-CM

## 2024-03-13 DIAGNOSIS — E78.5 HYPERLIPIDEMIA LDL GOAL <100: ICD-10-CM

## 2024-03-13 LAB
ANION GAP SERPL CALCULATED.3IONS-SCNC: 11 MMOL/L (ref 7–15)
BUN SERPL-MCNC: 10.3 MG/DL (ref 8–23)
CALCIUM SERPL-MCNC: 10 MG/DL (ref 8.8–10.2)
CHLORIDE SERPL-SCNC: 103 MMOL/L (ref 98–107)
CREAT SERPL-MCNC: 0.62 MG/DL (ref 0.51–0.95)
DEPRECATED HCO3 PLAS-SCNC: 25 MMOL/L (ref 22–29)
EGFRCR SERPLBLD CKD-EPI 2021: >90 ML/MIN/1.73M2
GLUCOSE SERPL-MCNC: 105 MG/DL (ref 70–99)
HBA1C MFR BLD: 6.4 %
HCV AB SERPL QL IA: NONREACTIVE
POTASSIUM SERPL-SCNC: 4.2 MMOL/L (ref 3.4–5.3)
SODIUM SERPL-SCNC: 139 MMOL/L (ref 135–145)

## 2024-03-13 PROCEDURE — G0009 ADMIN PNEUMOCOCCAL VACCINE: HCPCS | Performed by: FAMILY MEDICINE

## 2024-03-13 PROCEDURE — 80048 BASIC METABOLIC PNL TOTAL CA: CPT | Performed by: PATHOLOGY

## 2024-03-13 PROCEDURE — 99000 SPECIMEN HANDLING OFFICE-LAB: CPT | Performed by: PATHOLOGY

## 2024-03-13 PROCEDURE — 86803 HEPATITIS C AB TEST: CPT | Performed by: FAMILY MEDICINE

## 2024-03-13 PROCEDURE — 90677 PCV20 VACCINE IM: CPT | Performed by: FAMILY MEDICINE

## 2024-03-13 PROCEDURE — 36415 COLL VENOUS BLD VENIPUNCTURE: CPT | Performed by: PATHOLOGY

## 2024-03-13 PROCEDURE — 83036 HEMOGLOBIN GLYCOSYLATED A1C: CPT | Performed by: FAMILY MEDICINE

## 2024-03-13 PROCEDURE — 99213 OFFICE O/P EST LOW 20 MIN: CPT | Mod: 25 | Performed by: FAMILY MEDICINE

## 2024-03-13 ASSESSMENT — PATIENT HEALTH QUESTIONNAIRE - PHQ9
SUM OF ALL RESPONSES TO PHQ QUESTIONS 1-9: 4
SUM OF ALL RESPONSES TO PHQ QUESTIONS 1-9: 4
10. IF YOU CHECKED OFF ANY PROBLEMS, HOW DIFFICULT HAVE THESE PROBLEMS MADE IT FOR YOU TO DO YOUR WORK, TAKE CARE OF THINGS AT HOME, OR GET ALONG WITH OTHER PEOPLE: SOMEWHAT DIFFICULT

## 2024-03-13 NOTE — PROGRESS NOTES
Dorcas Huntley is a 68 year old, presenting for the following health issues:  Follow Up (6 month follow up), Diabetes, and Lipids (Pt would like to discuss lipids)      3/13/2024    10:43 AM   Additional Questions   Roomed by CRISTIANE     History of Present Illness       Diabetes:   She presents for follow up of diabetes.    She is not checking blood glucose. She checks blood glucose before meals.  Blood glucose is never over 200 and never under 70.     She has no concerns regarding her diabetes at this time.   She is not experiencing numbness or burning in feet, excessive thirst, blurry vision, weight changes or redness, sores or blisters on feet. The patient has had a diabetic eye exam in the last 12 months.          Hyperlipidemia:  She presents for follow up of hyperlipidemia.   She is taking medication to lower cholesterol. She is not having myalgia or other side effects to statin medications.    She eats 4 or more servings of fruits and vegetables daily.She consumes 0 sweetened beverage(s) daily.She exercises with enough effort to increase her heart rate 20 to 29 minutes per day.  She exercises with enough effort to increase her heart rate 7 days per week.   She is taking medications regularly.                     Objective    /77 (BP Location: Right arm, Patient Position: Sitting, Cuff Size: Adult Regular)   Pulse 82   Wt 74.6 kg (164 lb 8 oz)   SpO2 94%   BMI 27.58 kg/m    Body mass index is 27.58 kg/m .  Physical Exam               Signed Electronically by: Libby Black MD PhD

## 2024-03-13 NOTE — PATIENT INSTRUCTIONS
Prevnar 20 today  Blood work today   Continue with current diabetic medicine  Continue the statin    See me Sept for wellness exam

## 2024-03-14 ENCOUNTER — TELEPHONE (OUTPATIENT)
Dept: FAMILY MEDICINE | Facility: CLINIC | Age: 69
End: 2024-03-14
Payer: COMMERCIAL

## 2024-03-14 NOTE — TELEPHONE ENCOUNTER
RN called and spoke to the patient. The patient explained to the RN that she was out on a walk. The RN told the patient that the [facility] would send a [employer] message and that the patient could respond via [employer] at her convenience.

## 2024-03-14 NOTE — TELEPHONE ENCOUNTER
[facility] Call Center    Phone Message    May a detailed message be left on voicemail: yes     Reason for Call: Other: Pt requesting call back. Pt is needing to discuss some labs that were done yesterday and she stated there was one that was done that she is sure was not supposed to be

## 2024-03-14 NOTE — RESULT ENCOUNTER NOTE
Additional results available  Hep C screen was negative, good result.  Dg Ramires MD covering for Dr Black.

## 2024-04-12 ENCOUNTER — TELEPHONE (OUTPATIENT)
Dept: FAMILY MEDICINE | Facility: CLINIC | Age: 69
End: 2024-04-12
Payer: COMMERCIAL

## 2024-04-12 NOTE — TELEPHONE ENCOUNTER
Left Voicemail (1st Attempt) and Sent Mychart (1st Attempt) for the patient to call back and schedule the following:    Appointment type: EPP  Provider: PCP  Return date: 9/13

## 2024-04-17 ENCOUNTER — TELEPHONE (OUTPATIENT)
Dept: FAMILY MEDICINE | Facility: CLINIC | Age: 69
End: 2024-04-17
Payer: COMMERCIAL

## 2024-06-05 DIAGNOSIS — H35.30 ARMD (AGE-RELATED MACULAR DEGENERATION), BILATERAL: Primary | ICD-10-CM

## 2024-06-18 ENCOUNTER — OFFICE VISIT (OUTPATIENT)
Dept: OPHTHALMOLOGY | Facility: CLINIC | Age: 69
End: 2024-06-18
Attending: OPHTHALMOLOGY
Payer: COMMERCIAL

## 2024-06-18 DIAGNOSIS — H44.23 UNCOMPLICATED DEGENERATIVE MYOPIA OF BOTH EYES: ICD-10-CM

## 2024-06-18 DIAGNOSIS — Z96.1 PSEUDOPHAKIA OF BOTH EYES: ICD-10-CM

## 2024-06-18 DIAGNOSIS — E11.3299 NONPROLIFERATIVE DIABETIC RETINOPATHY ASSOCIATED WITH TYPE 2 DIABETES MELLITUS (H): ICD-10-CM

## 2024-06-18 DIAGNOSIS — H35.052 CHOROIDAL NEOVASCULAR MEMBRANE OF LEFT EYE: Primary | ICD-10-CM

## 2024-06-18 DIAGNOSIS — H44.2D3 BOTH EYES AFFECTED BY DEGENERATIVE MYOPIA WITH FOVEOSCHISIS: ICD-10-CM

## 2024-06-18 PROCEDURE — 99207 FUNDUS PHOTOS OU (BOTH EYES): CPT | Mod: 26 | Performed by: OPHTHALMOLOGY

## 2024-06-18 PROCEDURE — 92250 FUNDUS PHOTOGRAPHY W/I&R: CPT | Performed by: OPHTHALMOLOGY

## 2024-06-18 PROCEDURE — 92134 CPTRZ OPH DX IMG PST SGM RTA: CPT | Performed by: OPHTHALMOLOGY

## 2024-06-18 PROCEDURE — 99214 OFFICE O/P EST MOD 30 MIN: CPT | Performed by: OPHTHALMOLOGY

## 2024-06-18 PROCEDURE — G0463 HOSPITAL OUTPT CLINIC VISIT: HCPCS | Performed by: OPHTHALMOLOGY

## 2024-06-18 ASSESSMENT — VISUAL ACUITY
METHOD: SNELLEN - LINEAR
CORRECTION_TYPE: GLASSES
OS_CC+: -2
OD_CC: 20/20
OS_CC: 20/25

## 2024-06-18 ASSESSMENT — REFRACTION_WEARINGRX
OD_SPHERE: -3.25
OD_ADD: +2.75
OD_AXIS: 097
OS_AXIS: 078
OS_SPHERE: -3.25
OS_ADD: +2.75
OD_CYLINDER: +0.50
SPECS_TYPE: PAL
OS_CYLINDER: +0.75

## 2024-06-18 ASSESSMENT — CUP TO DISC RATIO
OD_RATIO: 0.4
OS_RATIO: 0.3

## 2024-06-18 ASSESSMENT — TONOMETRY
OS_IOP_MMHG: 23
OD_IOP_MMHG: 24
IOP_METHOD: TONOPEN

## 2024-06-18 ASSESSMENT — SLIT LAMP EXAM - LIDS
COMMENTS: NORMAL
COMMENTS: NORMAL

## 2024-06-18 ASSESSMENT — EXTERNAL EXAM - LEFT EYE: OS_EXAM: NORMAL

## 2024-06-18 ASSESSMENT — EXTERNAL EXAM - RIGHT EYE: OD_EXAM: NORMAL

## 2024-06-18 NOTE — NURSING NOTE
Chief Complaints and History of Present Illnesses   Patient presents with    Macular Degeneration Follow Up     Chief Complaint(s) and History of Present Illness(es)       Macular Degeneration Follow Up              Laterality: both eyes              Comments    Pt. States that she is doing well. No change in VA BE. No new flashes or floaters BE. No pain BE.   Luna Thomas COT 2:21 PM June 18, 2024

## 2024-06-18 NOTE — PROGRESS NOTES
CC -   Myopic Degeneration    INTERVAL HISTORY -vision improved significantly after CE IOL each eye (Hernandez)  CE IOL + avastin left eye on 01/02/2023  CE IOL right eye 01/09/2023      HPI -   Yuko Blanchard is a  67 year old year-old patient presented for myopic degeneration OU and diabetic eye exam. She was previously following with Dr. Murtaza Trinh MD at [facility].     Past ocular history of Myopic Degeneration with Retinal Neovascularization OU, Type 2 DM with no insulin use (diagnosed approximately 2018), ERM OS, PVD OU, and cataract OU. Denies curtains, flashing lights, increase in floaters. Reports stable vision.    She had a history of Avastin injections OU (uncertain about the exact number); starting roughly 8 years prior in the left eye and 1.5 years ago in the right eye. She reports most recently, she was extended to a 4 month interval.     Her last injection each eye 4/7/2022    PAST OCULAR SURGERY  -Denies ocular surgery and laser procedures    RETINAL IMAGING:  OCT 5/23/23  OD - PHF off of macula; ERM with schisis over the post staphyloma area and no obvious subretinal fluid; stable  OS - PHF off of macula; ERM with schisis and subretinal fluid inferiorly;  improved SRF and IRF; stable     FAF 08/10/22  OD- areas of hypo-AF nasal and inferonasal to fovea; window defect at macula but no active CNV  OS- Scattered hyper-AF within large area of hypo-AF inferior macula with leakage     FAF compatible with examination and findings    ASSESSMENT & PLAN    # Myopic Degeneration with CNV OU    -s/p Avastin each eye; left eye last inj 9/14/2022   -No active hemorrhage noted    -right eye no signs of active CNVM but some increased myopic schisis; left eye subretinal fluid resolved     - I think SRF and IRF (schisis) is mostly likely due to myopic degeneration (over posterior staphyloma area) that fluctuates naturally not myopic CNVM: observe with no injections at this time; Amsler grid  screening education given   - RTC 9-12 months for DFE and OCT    # PVD OU    -Signs and symptoms of RD/RT discussed 08/10/22    # DM Type II with mild NPDR right eye no signs of DR left eye   -Last A1c 6.0 on 10/2022   -Good BP and blood glucose control recommended    # High Myopia OU   - peripheral examination unremarkable   - 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 Hernandez; observe   - Retina detachment precautions were discussed with the patient (presence or increased in flashes, floaters or a curtain in the visual field) and was asked to return if any of the those occur    return to clinic: 9-12 months, OCT each eye (30 and 55 degrees) and optos each eye     Complete documentation of historical and exam elements from today's encounter can be found in the full encounter summary report (not reduplicated in this progress note). I personally obtained the chief complaint(s) and history of present illness.  I confirmed and edited as necessary the review of systems, past medical/surgical history, family history, social history, and examination findings as documented by others; and I examined the patient myself. I personally reviewed the relevant tests, images, and reports as documented above. I formulated and edited as necessary the assessment and plan and discussed the findings and management plan with the patient and family.     Solo Junior MD     Statement Selected

## 2024-07-01 ENCOUNTER — TELEPHONE (OUTPATIENT)
Dept: FAMILY MEDICINE | Facility: CLINIC | Age: 69
End: 2024-07-01
Payer: COMMERCIAL

## 2024-08-10 ENCOUNTER — HEALTH MAINTENANCE LETTER (OUTPATIENT)
Age: 69
End: 2024-08-10

## 2024-08-21 DIAGNOSIS — Z13.220 SCREENING FOR HYPERLIPIDEMIA: Primary | ICD-10-CM

## 2024-08-26 RX ORDER — ATORVASTATIN CALCIUM 10 MG/1
10 TABLET, FILM COATED ORAL DAILY
Qty: 90 TABLET | Refills: 1 | Status: SHIPPED | OUTPATIENT
Start: 2024-08-26

## 2024-09-27 SDOH — HEALTH STABILITY: PHYSICAL HEALTH: ON AVERAGE, HOW MANY MINUTES DO YOU ENGAGE IN EXERCISE AT THIS LEVEL?: 60 MIN

## 2024-09-27 SDOH — HEALTH STABILITY: PHYSICAL HEALTH: ON AVERAGE, HOW MANY DAYS PER WEEK DO YOU ENGAGE IN MODERATE TO STRENUOUS EXERCISE (LIKE A BRISK WALK)?: 7 DAYS

## 2024-09-27 ASSESSMENT — SOCIAL DETERMINANTS OF HEALTH (SDOH): HOW OFTEN DO YOU GET TOGETHER WITH FRIENDS OR RELATIVES?: MORE THAN THREE TIMES A WEEK

## 2024-10-02 ENCOUNTER — LAB (OUTPATIENT)
Dept: LAB | Facility: CLINIC | Age: 69
End: 2024-10-02
Payer: COMMERCIAL

## 2024-10-02 ENCOUNTER — OFFICE VISIT (OUTPATIENT)
Dept: FAMILY MEDICINE | Facility: CLINIC | Age: 69
End: 2024-10-02
Payer: COMMERCIAL

## 2024-10-02 VITALS
BODY MASS INDEX: 27.81 KG/M2 | HEART RATE: 92 BPM | DIASTOLIC BLOOD PRESSURE: 79 MMHG | SYSTOLIC BLOOD PRESSURE: 125 MMHG | WEIGHT: 165.9 LBS | OXYGEN SATURATION: 92 %

## 2024-10-02 DIAGNOSIS — M85.89 OSTEOPENIA OF MULTIPLE SITES: ICD-10-CM

## 2024-10-02 DIAGNOSIS — E11.3291: ICD-10-CM

## 2024-10-02 DIAGNOSIS — E78.5 HYPERLIPIDEMIA LDL GOAL <100: ICD-10-CM

## 2024-10-02 DIAGNOSIS — Z12.31 ENCOUNTER FOR SCREENING MAMMOGRAM FOR BREAST CANCER: ICD-10-CM

## 2024-10-02 DIAGNOSIS — Z13.0 SCREENING FOR DEFICIENCY ANEMIA: ICD-10-CM

## 2024-10-02 DIAGNOSIS — Z00.00 ENCOUNTER FOR MEDICARE ANNUAL WELLNESS EXAM: ICD-10-CM

## 2024-10-02 DIAGNOSIS — F41.9 ANXIETY: ICD-10-CM

## 2024-10-02 DIAGNOSIS — Z23 ENCOUNTER FOR IMMUNIZATION: ICD-10-CM

## 2024-10-02 DIAGNOSIS — Z00.00 MEDICARE ANNUAL WELLNESS VISIT, SUBSEQUENT: Primary | ICD-10-CM

## 2024-10-02 DIAGNOSIS — Z86.0100 HISTORY OF COLONIC POLYPS: ICD-10-CM

## 2024-10-02 LAB
ANION GAP SERPL CALCULATED.3IONS-SCNC: 11 MMOL/L (ref 7–15)
BASOPHILS # BLD AUTO: 0 10E3/UL (ref 0–0.2)
BASOPHILS NFR BLD AUTO: 1 %
BUN SERPL-MCNC: 10.3 MG/DL (ref 8–23)
CALCIUM SERPL-MCNC: 10 MG/DL (ref 8.8–10.4)
CHLORIDE SERPL-SCNC: 104 MMOL/L (ref 98–107)
CREAT SERPL-MCNC: 0.64 MG/DL (ref 0.51–0.95)
CREAT UR-MCNC: 28.1 MG/DL
EGFRCR SERPLBLD CKD-EPI 2021: >90 ML/MIN/1.73M2
EOSINOPHIL # BLD AUTO: 0.2 10E3/UL (ref 0–0.7)
EOSINOPHIL NFR BLD AUTO: 3 %
ERYTHROCYTE [DISTWIDTH] IN BLOOD BY AUTOMATED COUNT: 13 % (ref 10–15)
EST. AVERAGE GLUCOSE BLD GHB EST-MCNC: 131 MG/DL
GLUCOSE SERPL-MCNC: 102 MG/DL (ref 70–99)
HBA1C MFR BLD: 6.2 %
HCO3 SERPL-SCNC: 24 MMOL/L (ref 22–29)
HCT VFR BLD AUTO: 43.2 % (ref 35–47)
HGB BLD-MCNC: 14.7 G/DL (ref 11.7–15.7)
IMM GRANULOCYTES # BLD: 0.1 10E3/UL
IMM GRANULOCYTES NFR BLD: 1 %
LYMPHOCYTES # BLD AUTO: 2.4 10E3/UL (ref 0.8–5.3)
LYMPHOCYTES NFR BLD AUTO: 32 %
MCH RBC QN AUTO: 31.7 PG (ref 26.5–33)
MCHC RBC AUTO-ENTMCNC: 34 G/DL (ref 31.5–36.5)
MCV RBC AUTO: 93 FL (ref 78–100)
MICROALBUMIN UR-MCNC: <12 MG/L
MICROALBUMIN/CREAT UR: NORMAL MG/G{CREAT}
MONOCYTES # BLD AUTO: 0.6 10E3/UL (ref 0–1.3)
MONOCYTES NFR BLD AUTO: 8 %
NEUTROPHILS # BLD AUTO: 4.2 10E3/UL (ref 1.6–8.3)
NEUTROPHILS NFR BLD AUTO: 56 %
NRBC # BLD AUTO: 0 10E3/UL
NRBC BLD AUTO-RTO: 0 /100
PLATELET # BLD AUTO: 249 10E3/UL (ref 150–450)
POTASSIUM SERPL-SCNC: 4 MMOL/L (ref 3.4–5.3)
RBC # BLD AUTO: 4.63 10E6/UL (ref 3.8–5.2)
SODIUM SERPL-SCNC: 139 MMOL/L (ref 135–145)
WBC # BLD AUTO: 7.5 10E3/UL (ref 4–11)

## 2024-10-02 PROCEDURE — 82043 UR ALBUMIN QUANTITATIVE: CPT | Performed by: FAMILY MEDICINE

## 2024-10-02 PROCEDURE — 36415 COLL VENOUS BLD VENIPUNCTURE: CPT | Performed by: PATHOLOGY

## 2024-10-02 PROCEDURE — 83036 HEMOGLOBIN GLYCOSYLATED A1C: CPT | Performed by: FAMILY MEDICINE

## 2024-10-02 PROCEDURE — 80048 BASIC METABOLIC PNL TOTAL CA: CPT | Performed by: PATHOLOGY

## 2024-10-02 PROCEDURE — 99000 SPECIMEN HANDLING OFFICE-LAB: CPT | Performed by: PATHOLOGY

## 2024-10-02 PROCEDURE — G0439 PPPS, SUBSEQ VISIT: HCPCS | Performed by: FAMILY MEDICINE

## 2024-10-02 PROCEDURE — 85025 COMPLETE CBC W/AUTO DIFF WBC: CPT | Performed by: PATHOLOGY

## 2024-10-02 PROCEDURE — 90662 IIV NO PRSV INCREASED AG IM: CPT | Performed by: FAMILY MEDICINE

## 2024-10-02 PROCEDURE — G0008 ADMIN INFLUENZA VIRUS VAC: HCPCS | Performed by: FAMILY MEDICINE

## 2024-10-02 ASSESSMENT — ANXIETY QUESTIONNAIRES
IF YOU CHECKED OFF ANY PROBLEMS ON THIS QUESTIONNAIRE, HOW DIFFICULT HAVE THESE PROBLEMS MADE IT FOR YOU TO DO YOUR WORK, TAKE CARE OF THINGS AT HOME, OR GET ALONG WITH OTHER PEOPLE: NOT DIFFICULT AT ALL
3. WORRYING TOO MUCH ABOUT DIFFERENT THINGS: SEVERAL DAYS
GAD7 TOTAL SCORE: 3
2. NOT BEING ABLE TO STOP OR CONTROL WORRYING: NOT AT ALL
5. BEING SO RESTLESS THAT IT IS HARD TO SIT STILL: NOT AT ALL
1. FEELING NERVOUS, ANXIOUS, OR ON EDGE: SEVERAL DAYS
6. BECOMING EASILY ANNOYED OR IRRITABLE: NOT AT ALL
GAD7 TOTAL SCORE: 3
7. FEELING AFRAID AS IF SOMETHING AWFUL MIGHT HAPPEN: SEVERAL DAYS

## 2024-10-02 ASSESSMENT — PATIENT HEALTH QUESTIONNAIRE - PHQ9
5. POOR APPETITE OR OVEREATING: NOT AT ALL
SUM OF ALL RESPONSES TO PHQ QUESTIONS 1-9: 4

## 2024-10-02 NOTE — PATIENT INSTRUCTIONS
Flu shot today  Get bone density  Blood work today  Urine sample today  Colonoscopy 2027  Take blood pressure when you can - goal is less than 130/80  Keep track of foot tremor - if worsens let us know               Patient Education   Preventive Care Advice   This is general advice given by our system to help you stay healthy. However, your care team may have specific advice just for you. Please talk to your care team about your preventive care needs.  Nutrition  Eat 5 or more servings of fruits and vegetables each day.  Try wheat bread, brown rice and whole grain pasta (instead of white bread, rice, and pasta).  Get enough calcium and vitamin D. Check the label on foods and aim for 100% of the RDA (recommended daily allowance).  Lifestyle  Exercise at least 150 minutes each week  (30 minutes a day, 5 days a week).  Do muscle strengthening activities 2 days a week. These help control your weight and prevent disease.  No smoking.  Wear sunscreen to prevent skin cancer.  Have a dental exam and cleaning every 6 months.  Yearly exams  See your health care team every year to talk about:  Any changes in your health.  Any medicines your care team has prescribed.  Preventive care, family planning, and ways to prevent chronic diseases.  Shots (vaccines)   HPV shots (up to age 26), if you've never had them before.  Hepatitis B shots (up to age 59), if you've never had them before.  COVID-19 shot: Get this shot when it's due.  Flu shot: Get a flu shot every year.  Tetanus shot: Get a tetanus shot every 10 years.  Pneumococcal, hepatitis A, and RSV shots: Ask your care team if you need these based on your risk.  Shingles shot (for age 50 and up)  General health tests  Diabetes screening:  Starting at age 35, Get screened for diabetes at least every 3 years.  If you are younger than age 35, ask your care team if you should be screened for diabetes.  Cholesterol test: At age 39, start having a cholesterol test every 5 years, or  more often if advised.  Bone density scan (DEXA): At age 50, ask your care team if you should have this scan for osteoporosis (brittle bones).  Hepatitis C: Get tested at least once in your life.  STIs (sexually transmitted infections)  Before age 24: Ask your care team if you should be screened for STIs.  After age 24: Get screened for STIs if you're at risk. You are at risk for STIs (including HIV) if:  You are sexually active with more than one person.  You don't use condoms every time.  You or a partner was diagnosed with a sexually transmitted infection.  If you are at risk for HIV, ask about PrEP medicine to prevent HIV.  Get tested for HIV at least once in your life, whether you are at risk for HIV or not.  Cancer screening tests  Cervical cancer screening: If you have a cervix, begin getting regular cervical cancer screening tests starting at age 21.  Breast cancer scan (mammogram): If you've ever had breasts, begin having regular mammograms starting at age 40. This is a scan to check for breast cancer.  Colon cancer screening: It is important to start screening for colon cancer at age 45.  Have a colonoscopy test every 10 years (or more often if you're at risk) Or, ask your provider about stool tests like a FIT test every year or Cologuard test every 3 years.  To learn more about your testing options, visit:   .  For help making a decision, visit:   https://bit.ly/cu09996.  Prostate cancer screening test: If you have a prostate, ask your care team if a prostate cancer screening test (PSA) at age 55 is right for you.  Lung cancer screening: If you are a current or former smoker ages 50 to 80, ask your care team if ongoing lung cancer screenings are right for you.  For informational purposes only. Not to replace the advice of your health care provider. Copyright   2023 [employer]. All rights reserved. Clinically reviewed by the [facility]. Original [id number] - REV  01/24.  Nutrition for Older Adults: Care Instructions  Overview     Good nutrition is important at any age. But it is especially important for older adults. Eating healthy foods helps keep your body strong. And it can help lower your risk for disease.  As you get older, your body needs more of certain nutrients. These include vitamin B12, calcium, and vitamin D. But it may be harder for you to get these and other important nutrients. This could be for many reasons. You may not feel as hungry as you used to. Or you could have problems with your teeth or mouth that make it hard to chew. Or you may not enjoy planning and preparing meals, especially if you live alone.  Talk with your doctor if you want help getting the most nutrition from what you eat. They may have you work with a dietitian to help you plan meals.  Follow-up care is a key part of your treatment and safety. Be sure to make and go to all appointments, and call your doctor if you are having problems. It's also a good idea to know your test results and keep a list of the medicines you take.  How can you care for yourself at home?  To stay healthy  Eat a variety of foods. The more you vary the foods you eat, the more vitamins, minerals, and other nutrients you get.  Ask your doctor if you should take a multivitamin. Choose one with about 100% of the daily value (DV) for vitamins and minerals. Do not take more than 100% of the daily value for any vitamin or mineral unless your doctor tells you to. Talk with your doctor if you are not sure which multivitamin is right for you.  Try to eat lots of fruits and vegetables. Fresh or frozen vegetables and fruits are healthy choices. Choose canned vegetables that have no salt added and fruits that are canned in their own juice or light syrup.  Include foods that are high in vitamin B12 in your diet. Good choices are fortified breakfast cereal, nonfat or low-fat milk and other dairy products, meat, poultry, fish, and  eggs.  Get enough calcium and vitamin D. Good choices include nonfat or low-fat milk, cheese, and yogurt. Other good options are tofu, orange juice with added calcium, and some leafy green vegetables, such as jarrod greens and kale. If you don't use milk products, talk to your doctor about calcium and vitamin D supplements.  Try to eat protein foods every day. Good choices include lean meat, fish, poultry, eggs, and cheese. Other good options are cooked beans, peanut butter, and nuts and seeds.  Choose whole grains for half of the grains you eat. Look for 100% whole wheat bread, whole-grain cereals, brown rice, and other whole grains.  If you have constipation  Eat high-fiber foods every day if you can. These include fruits, vegetables, cooked dried beans, and whole grains.  Drink plenty of fluids. If you have kidney, heart, or liver disease and have to limit fluids, talk with your doctor before you increase the amount of fluids you drink.  Ask your doctor if stool softeners may help keep your bowels regular.  If you have mouth problems that make chewing hard  Pick canned or cooked fruits and vegetables. These are often softer.  Chop or shred meat, poultry, and fish. Add sauce or gravy to the meat to help keep it moist.  Pick other protein foods that are soft. These include cheese, peanut butter, cooked beans, cottage cheese, and eggs.  If you have trouble shopping for yourself  Ask a local food store to deliver groceries to your home.  Contact your local area agency on aging and ask about resources that can help.  Ask a family member or neighbor to help you.  If you have trouble preparing meals  Try easier cooking methods such as using a slow cooker or microwave oven.  Let the grocery store do some of the work for you. Look for precut, washed, and ready-to-eat foods.  Take part in group meal programs. You can find these through senior citizen programs.  Have meals brought to your home. Your community may offer  "programs that deliver meals, such as Meals on Wheels. Or you could use an online meal delivery service.  If you are able, take a cooking class.  If your appetite is poor  Try to eat meals on a regular schedule. It may help to eat smaller meals more often throughout the day.  If you can, eat some meals with other people. You could ask family or friends to eat with you. Or you could take part in group meal programs offered in your community.  Ask your doctor if your medicines could cause appetite or taste problems. If so, ask about changing medicines.  Add spices and herbs to increase the flavor of food.  If you think you are depressed, ask your doctor for help. Depression can affect your appetite. And it can make it hard to do everyday activities like grocery shopping and making meals. Treatment can help.  When should you call for help?  Watch closely for changes in your health, and be sure to contact your doctor if you have any problems.  Where can you learn more?  Go to https://www.[employer].net/patiented  Enter L643 in the search box to learn more about \"Nutrition for Older Adults: Care Instructions.\"  Current as of: September 25, 2023  Content Version: 14.2 2024 [employer].   Care instructions adapted under license by your healthcare professional. If you have questions about a medical condition or this instruction, always ask your healthcare professional. [employer] disclaims any warranty or liability for your use of this information.    Preventing Falls: Care Instructions  Injuries and health problems such as trouble walking or poor eyesight can increase your risk of falling. So can some medicines. But there are things you can do to help prevent falls. You can exercise to get stronger. You can also arrange your home to make it safer.    Talk to your doctor about the medicines you take. Ask if any of them increase the risk of falls and whether they can be changed or stopped.   Try to " "exercise regularly. It can help improve your strength and balance. This can help lower your risk of falling.     Practice fall safety and prevention.    Wear low-heeled shoes that fit well and give your feet good support. Talk to your doctor if you have foot problems that make this hard.  Carry a cellphone or wear a medical alert device that you can use to call for help.  Use stepladders instead of chairs to reach high objects. Don't climb if you're at risk for falls. Ask for help, if needed.  Wear the correct eyeglasses, if you need them.    Make your home safer.    Remove rugs, cords, clutter, and furniture from walkways.  Keep your house well lit. Use night-lights in hallways and bathrooms.  Install and use sturdy handrails on stairways.  Wear nonskid footwear, even inside. Don't walk barefoot or in socks without shoes.    Be safe outside.    Use handrails, curb cuts, and ramps whenever possible.  Keep your hands free by using a shoulder bag or backpack.  Try to walk in well-lit areas. Watch out for uneven ground, changes in pavement, and debris.  Be careful in the winter. Walk on the grass or gravel when sidewalks are slippery. Use de-icer on steps and walkways. Add non-slip devices to shoes.    Put grab bars and nonskid mats in your shower or tub and near the toilet. Try to use a shower chair or bath bench when bathing.   Get into a tub or shower by putting in your weaker leg first. Get out with your strong side first. Have a phone or medical alert device in the bathroom with you.   Where can you learn more?  Go to https://www.[employer].net/patiented  Enter G117 in the search box to learn more about \"Preventing Falls: Care Instructions.\"  Current as of: July 17, 2023               Content Version: 14.0    0211-3487 [employer].   Care instructions adapted under license by your healthcare professional. If you have questions about a medical condition or this instruction, always ask your healthcare " professional. That{img}, [facility] disclaims any warranty or liability for your use of this information.      Hearing Loss: Care Instructions  Overview     Hearing loss is a sudden or slow decrease in how well you hear. It can range from slight to profound. Permanent hearing loss can occur with aging. It also can happen when you are exposed long-term to loud noise. Examples include listening to loud music, riding motorcycles, or being around other loud machines.  Hearing loss can affect your work and home life. It can make you feel lonely or depressed. You may feel that you have lost your independence. But hearing aids and other devices can help you hear better and feel connected to others.  Follow-up care is a key part of your treatment and safety. Be sure to make and go to all appointments, and call your doctor if you are having problems. It's also a good idea to know your test results and keep a list of the medicines you take.  How can you care for yourself at home?  Avoid loud noises whenever possible. This helps keep your hearing from getting worse.  Always wear hearing protection around loud noises.  Wear a hearing aid as directed.  A professional can help you pick a hearing aid that will work best for you.  You can also get hearing aids over the counter for mild to moderate hearing loss.  Have hearing tests as your doctor suggests. They can show whether your hearing has changed. Your hearing aid may need to be adjusted.  Use other devices as needed. These may include:  Telephone amplifiers and hearing aids that can connect to a television, stereo, radio, or microphone.  Devices that use lights or vibrations. These alert you to the doorbell, a ringing telephone, or a baby monitor.  Television closed-captioning. This shows the words at the bottom of the screen. Most new TVs can do this.  TTY (text telephone). This lets you type messages back and forth on the telephone instead of talking or listening. These  "devices are also called TDD. When messages are typed on the keyboard, they are sent over the phone line to a receiving TTY. The message is shown on a monitor.  Use text messaging, social media, and email if it is hard for you to communicate by telephone.  Try to learn a listening technique called speechreading. It is not lipreading. You pay attention to people's gestures, expressions, posture, and tone of voice. These clues can help you understand what a person is saying. Face the person you are talking to, and have them face you. Make sure the lighting is good. You need to see the other person's face clearly.  Think about counseling if you need help to adjust to your hearing loss.  When should you call for help?  Watch closely for changes in your health, and be sure to contact your doctor if:    You think your hearing is getting worse.     You have new symptoms, such as dizziness or nausea.   Where can you learn more?  Go to https://www.[employer].net/patiented  Enter R798 in the search box to learn more about \"Hearing Loss: Care Instructions.\"  Current as of: September 27, 2023               Content Version: 14.0    0745-9768 [employer].   Care instructions adapted under license by your healthcare professional. If you have questions about a medical condition or this instruction, always ask your healthcare professional. [employer] disclaims any warranty or liability for your use of this information.      Bladder Training: Care Instructions  Your Care Instructions     Bladder training is used to treat urge incontinence and stress incontinence. Urge incontinence means that the need to urinate comes on so fast that you can't get to a toilet in time. Stress incontinence means that you leak urine because of pressure on your bladder. For example, it may happen when you laugh, cough, or lift something heavy.  Bladder training can increase how long you can wait before you have to urinate. It can " also help your bladder hold more urine. And it can give you better control over the urge to urinate.  It is important to remember that bladder training takes a few weeks to a few months to make a difference. You may not see results right away, but don't give up.  Follow-up care is a key part of your treatment and safety. Be sure to make and go to all appointments, and call your doctor if you are having problems. It's also a good idea to know your test results and keep a list of the medicines you take.  How can you care for yourself at home?  Work with your doctor to come up with a bladder training program that is right for you. You may use one or more of the following methods.  Delayed urination  In the beginning, try to keep from urinating for 5 minutes after you first feel the need to go.  While you wait, take deep, slow breaths to relax. Kegel exercises can also help you delay the need to go to the bathroom.  After some practice, when you can easily wait 5 minutes to urinate, try to wait 10 minutes before you urinate.  Slowly increase the waiting period until you are able to control when you have to urinate.  Scheduled urination  Empty your bladder when you first wake up in the morning.  Schedule times throughout the day when you will urinate.  Start by going to the bathroom every hour, even if you don't need to go.  Slowly increase the time between trips to the bathroom.  When you have found a schedule that works well for you, keep doing it.  If you wake up during the night and have to urinate, do it. Apply your schedule to waking hours only.  Kegel exercises  These tighten and strengthen pelvic muscles, which can help you control the flow of urine. (If doing these exercises causes pain, stop doing them and talk with your doctor.) To do Kegel exercises:  Squeeze your muscles as if you were trying not to pass gas. Or squeeze your muscles as if you were stopping the flow of urine. Your belly, legs, and buttocks  "shouldn't move.  Hold the squeeze for 3 seconds, then relax for 5 to 10 seconds.  Start with 3 seconds, then add 1 second each week until you are able to squeeze for 10 seconds.  Repeat the exercise 10 times a session. Do 3 to 8 sessions a day.  When should you call for help?  Watch closely for changes in your health, and be sure to contact your doctor if:    Your incontinence is getting worse.     You do not get better as expected.   Where can you learn more?  Go to https://www.[employer].net/patiented  Enter V684 in the search box to learn more about \"Bladder Training: Care Instructions.\"  Current as of: November 15, 2023               Content Version: 14.0    3747-6964 [facility].   Care instructions adapted under license by your healthcare professional. If you have questions about a medical condition or this instruction, always ask your healthcare professional. [facility] disclaims any warranty or liability for your use of this information.      Learning About Being Active as an Older Adult  Why is being active important as you get older?     Being active is one of the best things you can do for your health. And it's never too late to start. Being active--or getting active, if you aren't already--has definite benefits. It can:  Give you more energy,  Keep your mind sharp.  Improve balance to reduce your risk of falls.  Help you manage chronic illness with fewer medicines.  No matter how old you are, how fit you are, or what health problems you have, there is a form of activity that will work for you. And the more physical activity you can do, the better your overall health will be.  What kinds of activity can help you stay healthy?  Being more active will make your daily activities easier. Physical activity includes planned exercise and things you do in daily life. There are four types of activity:  Aerobic.  Doing aerobic activity makes your heart and lungs strong.  Includes walking, " dancing, and gardening.  Aim for at least 2  hours spread throughout the week.  It improves your energy and can help you sleep better.  Muscle-strengthening.  This type of activity can help maintain muscle and strengthen bones.  Includes climbing stairs, using resistance bands, and lifting or carrying heavy loads.  Aim for at least twice a week.  It can help protect the knees and other joints.  Stretching.  Stretching gives you better range of motion in joints and muscles.  Includes upper arm stretches, calf stretches, and gentle yoga.  Aim for at least twice a week, preferably after your muscles are warmed up from other activities.  It can help you function better in daily life.  Balancing.  This helps you stay coordinated and have good posture.  Includes heel-to-toe walking, kunal chi, and certain types of yoga.  Aim for at least 3 days a week.  It can reduce your risk of falling.  Even if you have a hard time meeting the recommendations, it's better to be more active than less active. All activity done in each category counts toward your weekly total. You'd be surprised how daily things like carrying groceries, keeping up with grandchildren, and taking the stairs can add up.  What keeps you from being active?  If you've had a hard time being more active, you're not alone. Maybe you remember being able to do more. Or maybe you've never thought of yourself as being active. It's frustrating when you can't do the things you want. Being more active can help. What's holding you back?  Getting started.  Have a goal, but break it into easy tasks. Small steps build into big accomplishments.  Staying motivated.  If you feel like skipping your activity, remember your goal. Maybe you want to move better and stay independent. Every activity gets you one step closer.  Not feeling your best.  Start with 5 minutes of an activity you enjoy. Prove to yourself you can do it. As you get comfortable, increase your time.  You may not be  "where you want to be. But you're in the process of getting there. Everyone starts somewhere.  How can you find safe ways to stay active?  Talk with your doctor about any physical challenges you're facing. Make a plan with your doctor if you have a health problem or aren't sure how to get started with activity.  If you're already active, ask your doctor if there is anything you should change to stay safe as your body and health change.  If you tend to feel dizzy after you take medicine, avoid activity at that time. Try being active before you take your medicine. This will reduce your risk of falls.  If you plan to be active at home, make sure to clear your space before you get started. Remove things like TV cords, coffee tables, and throw rugs. It's safest to have plenty of space to move freely.  The key to getting more active is to take it slow and steady. Try to improve only a little bit at a time. Pick just one area to improve on at first. And if an activity hurts, stop and talk to your doctor.  Where can you learn more?  Go to https://www.[employer].net/patiented  Enter P600 in the search box to learn more about \"Learning About Being Active as an Older Adult.\"  Current as of: June 5, 2023  Content Version: 14.2 2024 [facility] [employer].   Care instructions adapted under license by your healthcare professional. If you have questions about a medical condition or this instruction, always ask your healthcare professional. [employer], Incorporated disclaims any warranty or liability for your use of this information.       "

## 2024-10-02 NOTE — PROGRESS NOTES
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  Foot exam today was normal.  Will check her urine for protein.  She had an eye exam in it has remained stable.  She does have some evidence of retinopathy of the right eye.  She also has macular degeneration.  - Hemoglobin A1c  - Basic metabolic panel  (Ca, Cl, CO2, Creat, Gluc, K, Na, BUN)  - Albumin Random Urine Quantitative with Creat Ratio    Encounter for immunization  She wanted a flu shot  - INFLUENZA HIGH DOSE, TRIVALENT, PF (FLUZONE)    History of colonic polyps  She has had a history of colon polyps, 1 was a tubular adenoma the other was a hyperplastic polyp, next colonoscopy is due in 2027    Anxiety  She does have ongoing anxiety seems to be better.  She is not on medication.  Her TERRA is 3 today, PHQ-9 is 4    Medicare annual wellness visit, subsequent  Immunizations are up to date.  Pap is not indicated.  Mammogram is due.  Colonoscopy as above due in 2027.    Screening for deficiency anemia  Will screen for anemia  - CBC with platelets and differential    Hyperlipidemia LDL goal <100  She currently is on a statin, her LDL in March 2024 was excellent    Osteopenia of multiple sites  Her last DEXA was 2 years ago and is due.  We talked about calcium  - DX Bone Density    Encounter for screening mammogram for breast cancer  Had mammogram last in October 2023 so she is due for this.  She did have diagnostic testing at that time.  Her paternal grandmother had breast cancer but she does not want genetic testing.  - Mammogram, screening w chloe (3D)        Patient has been advised of split billing requirements and indicates understanding:  Yes        Counseling  Appropriate preventive services were addressed with this patient via screening, questionnaire, or discussion as appropriate for fall prevention, nutrition, physical activity, Tobacco-use cessation, social engagement, weight loss and cognition.  Checklist reviewing preventive services available has been given to the patient.  Reviewed patient's diet, addressing concerns and/or questions.   The patient was provided with written information regarding signs of hearing loss.   Information on urinary incontinence and treatment options given to patient.           Return in about 6 months (around 4/2/2025) for  follow up for diabetes..    Dorcas Huntley is a 68 year old, presenting for the following:  Physical        10/2/2024    10:40 AM   Additional Questions   Roomed by matteo david         Health Care Directive  Patient does not have a Health Care Directive or Living Will: Discussed advance care planning with patient; information given to patient to review.    HPI   Gyn:  PM  She is [id number] -  No vaginal bleeding - pap's have been normal - last one 2020 and normal   Mammogram 10/2023    DM 2  has type 2 diabetes for about 6 yrs.  On metformin and glipizide. No  problems with the medicines - testing occasionally  - fasting about 100  and very consistent,   rare periods of low sugar.  A1C  was 6.4 in 3/2024   Eye exam:  5/2024  Has mild NPDR right eye  - has macular degeneration - had cataract surgery in Jan 2023 -   Foot exam  - no foot sores - no foot numbness  exam was 9/2023   Microalbuminuria - 11/2023 microalb was neg   No ASA      Elevated BP -on occasion has been up   - no meds - blood pressure at goal today   - not doing home measurements   - never had      Hyperlipidemia   -started a statin -recent LDL =44  3/2024      Anxiety - got worse after retired - tries to do exercise and getting out - does get panic attacks - heart races and gets afraid of travelling or going outside  - pretty much  gone away - no meds      Osteopenia: DXA  Left femoral neck  = -1.8  last DXA was 9/2022 - DXA due      Health Maintenance  Colonoscopy:10/2022 - polyps and told repeat 5 yrs  Shingrix  Yes  Mammogram - 9/2022  DXA 9/2022 9/27/2024   General Health   How would you rate your overall physical health? Good   Feel stress (tense, anxious, or unable to sleep) To some extent      (!) STRESS CONCERN      9/27/2024   Nutrition   Diet: Diabetic            9/27/2024   Exercise   Days per week of moderate/strenous exercise 7 days   Average minutes spent exercising at this level 60 min            9/27/2024   Social Factors   Frequency of gathering with friends or relatives More than three times a week   Worry food won't last until get money to buy more No   Food not last or not have enough money for food? No   Do you have housing? (Housing is defined as stable permanent housing and does not include staying ouside in a car, in a tent, in an abandoned building, in an overnight shelter, or couch-surfing.) Yes   Are you worried about losing your housing? No   Lack of transportation? No   Unable to get utilities (heat,electricity)? No            9/27/2024   Fall Risk   Fallen 2 or more times in the past year? Yes    Yes   Trouble with walking or balance? No    No       Multiple values from one day are sorted in reverse-chronological order           9/27/2024   Activities of Daily Living- Home Safety   Needs help with the following daily activites None of the above   Safety concerns in the home None of the above            9/27/2024   Dental   Dentist two times every year? Yes            9/27/2024   Hearing Screening   Hearing concerns? (!) IT'S HARD TO FOLLOW A CONVERSATION IN A NOISY RESTAURANT OR CROWDED ROOM.            9/27/2024   Driving Risk Screening   Patient/family members have concerns about driving No            9/27/2024   General Alertness/Fatigue Screening   Have you been more tired than usual lately? No             2024   Urinary Incontinence Screening   Bothered by leaking urine in past 6 months Yes            2024   TB Screening   Were you born outside of the [place]? No                  2024   Substance Use   Alcohol more than 3/day or more than 7/wk No   Do you have a current opioid prescription? No   How severe/bad is pain from 1 to 10? 3/10   Do you use any other substances recreationally? No        Social History     Tobacco Use    Smoking status: Former     Current packs/day: 0.00     Average packs/day: 1.5 packs/day for 13.0 years (19.5 ttl pk-yrs)     Types: Cigarettes     Start date: 1980     Quit date: 1992     Years since quittin.7    Smokeless tobacco: Never   Substance Use Topics    Alcohol use: Never    Drug use: Not Currently     Types: Marijuana           10/2/2024   LAST FHS-7 RESULTS   1st degree relative breast or ovarian cancer No   Any relative bilateral breast cancer No   Any male have breast cancer No   Any ONE woman have BOTH breast AND ovarian cancer No   Any woman with breast cancer before 50yrs YesPGM   2 or more relatives with breast AND/OR ovarian cancer No   2 or more relatives with breast AND/OR bowel cancer No     PGM had breast cancer - does not want genetic counseling         Mammogram Screening - Mammogram every 1-2 years updated in Health Maintenance based on mutual decision making      History of abnormal Pap smear: No - age 65 or older with adequate negative prior screening test results (3 consecutive negative cytology results, 2 consecutive negative cotesting results, or 2 consecutive negative HrHPV test results within 10 years, with the most recent test occurring within the recommended screening interval for the test used)       ASCVD Risk   The ASCVD Risk score (Louie RICARDO, et al., 2019) failed to calculate for the following reasons:    The valid total cholesterol range is 130 to 320 mg/dL            Reviewed and updated as needed this visit by  "Provider                      Current providers sharing in care for this patient include:  Patient Care Team:  Libby Black MD PhD as PCP - General  Solo Tatum MD as MD (Ophthalmology)  Libby Black MD PhD as Assigned PCP  Solo Tatum MD as Assigned Surgical Provider    The following health maintenance items are reviewed in Epic and correct as of today:  Health Maintenance   Topic Date Due    A1C  06/13/2024    MEDICARE ANNUAL WELLNESS VISIT  06/14/2024    INFLUENZA VACCINE (1) 09/01/2024    COVID-19 Vaccine (7 - 2024-25 season) 09/01/2024    MICROALBUMIN  09/13/2024    LIPID  03/07/2025    BMP  03/13/2025    DIABETIC FOOT EXAM  03/13/2025    ANNUAL REVIEW OF HM ORDERS  03/13/2025    EYE EXAM  06/18/2025    FALL RISK ASSESSMENT  10/02/2025    MAMMO SCREENING  10/03/2025    COLORECTAL CANCER SCREENING  10/13/2027    ADVANCE CARE PLANNING  06/14/2028    DTAP/TDAP/TD IMMUNIZATION (3 - Td or Tdap) 05/10/2033    DEXA  09/09/2037    HEPATITIS C SCREENING  Completed    PHQ-2 (once per calendar year)  Completed    Pneumococcal Vaccine: 65+ Years  Completed    ZOSTER IMMUNIZATION  Completed    RSV VACCINE  Completed    HPV IMMUNIZATION  Aged Out    MENINGITIS IMMUNIZATION  Aged Out    RSV MONOCLONAL ANTIBODY  Aged Out       ROS c/o of foot hurting - the top of left foot - no injury or fall - hurts more when walking, has environmental allergies, has long standing anxiety and depression - the rest of the complete ROS is negative other than as mentioned above     Objective    Exam  /79 (BP Location: Right arm, Patient Position: Sitting, Cuff Size: Adult Regular)   Pulse 92   Wt 75.3 kg (165 lb 14.4 oz)   SpO2 92%   BMI 27.81 kg/m     Estimated body mass index is 27.81 kg/m  as calculated from the following:    Height as of 9/13/23: 1.645 m (5' 4.76\").    Weight as of this encounter: 75.3 kg (165 lb 14.4 oz).    Physical Exam  GENERAL: alert and no distress  EYES: Eyes " grossly normal to inspection, PERRL and conjunctivae and sclerae normal  HENT: ear canals and TM's normal, nose and mouth without ulcers or lesions  NECK: no adenopathy, no asymmetry, masses, or scars  RESP: lungs clear to auscultation - no rales, rhonchi or wheezes  BREAST: normal without masses, tenderness or nipple discharge and no palpable axillary masses or adenopathy  CV: regular rate and rhythm, normal S1 S2, no S3 or S4, no murmur, click or rub, no peripheral edema  ABDOMEN: soft, nontender, no hepatosplenomegaly, no masses and bowel sounds normal  MS: No tenderness dorsum left foot, no swelling has full ROM of foot  - no gross musculoskeletal defects noted, no edema  SKIN: no suspicious lesions or rashes  NEURO: Normal strength and tone, mentation intact and speech normal  PSYCH: mentation appears normal, affect normal/bright  LYMPH: no cervical, supraclavicular, axillary, or inguinal adenopathy  FOOT EXAM normal pulses, monofilament is intact bilaterall at 9 points        10/2/2024   Mini Cog   Clock Draw Score 2 Normal   3 Item Recall 3 objects recalled   Mini Cog Total Score 5                 Signed Electronically by: Libby Black MD PhD

## 2024-10-16 ENCOUNTER — TELEPHONE (OUTPATIENT)
Dept: FAMILY MEDICINE | Facility: CLINIC | Age: 69
End: 2024-10-16
Payer: COMMERCIAL

## 2024-10-16 NOTE — TELEPHONE ENCOUNTER
"Per check out \"Return in about 6 months (around 4/2/2025) for follow up for diabetes\". Spoke with pt and she declined to schedule today, she will call back to schedule a follow up with PCP  "

## 2024-10-17 ENCOUNTER — ANCILLARY PROCEDURE (OUTPATIENT)
Dept: MAMMOGRAPHY | Facility: CLINIC | Age: 69
End: 2024-10-17
Attending: FAMILY MEDICINE
Payer: COMMERCIAL

## 2024-10-17 DIAGNOSIS — Z12.31 ENCOUNTER FOR SCREENING MAMMOGRAM FOR BREAST CANCER: ICD-10-CM

## 2024-10-17 PROCEDURE — 77067 SCR MAMMO BI INCL CAD: CPT | Mod: GC

## 2024-10-17 PROCEDURE — 77063 BREAST TOMOSYNTHESIS BI: CPT | Mod: GC

## 2024-11-18 DIAGNOSIS — E11.3291: ICD-10-CM

## 2024-11-20 RX ORDER — GLIPIZIDE 5 MG/1
5 TABLET, FILM COATED, EXTENDED RELEASE ORAL DAILY
Qty: 90 TABLET | Refills: 3 | Status: SHIPPED | OUTPATIENT
Start: 2024-11-20

## 2024-11-20 NOTE — TELEPHONE ENCOUNTER
Metformin 500mg tablet - 2 tablets BID with meals    Glipizide 5 mg 24 hour tablet - 1 tablet PO daily    Last Clinic Visit: 10/02/2024

## 2025-01-25 ENCOUNTER — HEALTH MAINTENANCE LETTER (OUTPATIENT)
Age: 70
End: 2025-01-25

## 2025-04-03 ENCOUNTER — LAB (OUTPATIENT)
Dept: LAB | Facility: CLINIC | Age: 70
End: 2025-04-03
Payer: COMMERCIAL

## 2025-04-03 DIAGNOSIS — E11.3291: ICD-10-CM

## 2025-04-03 LAB
CHOLEST SERPL-MCNC: 140 MG/DL
EST. AVERAGE GLUCOSE BLD GHB EST-MCNC: 140 MG/DL
FASTING STATUS PATIENT QL REPORTED: YES
HBA1C MFR BLD: 6.5 %
HDLC SERPL-MCNC: 44 MG/DL
LDLC SERPL CALC-MCNC: 60 MG/DL
NONHDLC SERPL-MCNC: 96 MG/DL
TRIGL SERPL-MCNC: 178 MG/DL

## 2025-04-03 PROCEDURE — 99000 SPECIMEN HANDLING OFFICE-LAB: CPT | Performed by: PATHOLOGY

## 2025-04-03 PROCEDURE — 83036 HEMOGLOBIN GLYCOSYLATED A1C: CPT | Performed by: FAMILY MEDICINE

## 2025-04-14 NOTE — PROGRESS NOTES
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 The last time she saw ophthalmology was in 6/2024. Her A1C on 4/3/2025 is 6.5% which is still at goal yet the highest she has ever gotten. Her previous A1C was 6.2% on 10/2024. Patient will keep taking metformin and glipizide since A1C is on an upward trend.  She is due for a microalbumin test. She is not on ASA.      - Albumin Random Urine Quantitative with Creat Ratio  - Albumin Random Urine Quantitative with Creat Ratio        Screening for hyperlipidemia  Her LDL was 60 mg/dl on 4/3/2025 and triglycerides were 178 mg/dl. She is on a statin and no side effects.      Anxiety  Anxiety has had remarkable improvement. TERRA = 4  today  - not on meds - no counseling.   No course of action needed currently.     Osteopenia of multiple sites  Patient doesn't want DXA scan although recommended. Her last DXA  9/2022 and  showed left femoral neck = -1.8.  We will proceed the patient's wishes will be followed. Discussed calcium  and vit D.      Primary hypertension  Her bp is not at goal  so discussed starting a medication - start lisinopril 2.5mg - get potassium in 2 wks.   lisinopril (ZESTRIL) 2.5 MG tablet  Dispense: 60 tablet; Refill: 3  Potassium will be checked in two weeks to make sure the Pt is tolerating the ACEi.   Patient was encouraged to take home measurements of BP.     Encounter for immunization  COVID-19 12+ (PFIZER)      Time note (e5, 40'): The total of my time (on the date of service) for this service was 41 minutes, including discussion/face-to-face, chart review, interpretation not otherwise reported, documentation, and updating of the computerized record.    I appreciate the note above by medical student,.  I, Libby Black  was present with the  medical student, who participated in the service and in the documentation of the note. I have verified the history and personally performed the physical exam and medical decision making. I have edited accordingly and agree with the assessment and plan of care as documented in the note.     George LO - I helped Dr. Black with the care of this patient.     The longitudinal plan of care for the diagnosis(es)/condition(s) as documented were addressed during this visit. Due to the added complexity in care, I will continue to support Yuko in the subsequent management and with ongoing continuity of care.      Wellness - in 6 months     Subjective   Yuko is a 69 year old, presenting for the following health issues:  Diabetes follow up     HPI   70 yo female who had wellness visit in 10/2024 - comes back for f/up. Her A1C is a little higher than previously. She is taking metformin and glipizide and is self-conscious about what she eats. She is seeing ophthalmology for diabetic retinopathy. She doesn't have symptoms of diabetic neuropathy. She is taking the statin she was prescribed recently and denies any side effects. Her anxiety got better after her root canal work. She still feels uncomfortable with the idea of travelling. Patient does not want any DXA scan right now.     DM 2  She has type 2 diabetes for about 6 yrs. On metformin and glipizide. No problems with the medicines - testing occasionally - fasting about 100  and very consistent, rare periods of low sugar.  A1C was 6.5 on 4/2025.    Eye exam: 5/2024  Has mild NPDR right eye  - has macular degeneration - had cataract surgery in Jan 2023 -    Foot exam  - no foot sores - no foot numbness  exam was 9/2023. no numbness today or any symptoms of diabetic neuropathy.   Microalbuminuria - 11/2023 microalb was neg.   No ASA      Elevated BP -on occasion has been up   - no meds - blood pressure at goal today   - not doing home measurements. Today, blood pressure  "is 137/76. Patient will be put on an ACEi to help with blood pressure regulation.      Hyperlipidemia  - started a statin -recent LDL =44  3/2024.  Her LDL on 4/3/2025 is 60 mg/dl. She has no side effects of statin. Triglyceride is 126 mg/dl on 4/3 after having been 178 in 2024.      Anxiety - got worse after retired - tries to do exercise and getting out - does get panic attacks - heart races and gets afraid of travelling or going outside  - pretty much gone away - no meds TERRA =4  PHQ2    Osteopenia: DXA  Left femoral neck  = -1.8  last DXA was 9/2022 - DXA due     Review of Systems  Constitutional, neuro, ENT, endocrine, pulmonary, cardiac, gastrointestinal, genitourinary, musculoskeletal, integument and psychiatric systems are negative, except as otherwise noted.      Objective    /76 (BP Location: Right arm, Patient Position: Sitting, Cuff Size: Adult Regular)   Pulse 107   Temp 98.1  F (36.7  C) (Oral)   Resp 16   Ht 1.645 m (5' 4.76\")   Wt 80.2 kg (176 lb 12.8 oz)   SpO2 93%   BMI 29.64 kg/m    There is no height or weight on file to calculate BMI.    Physical Exam   GENERAL: alert and no distress  EYES: Eyes grossly normal to inspection.  No discharge or erythema, or obvious scleral/conjunctival abnormalities.  NECK  no adenopathy,   CARDIAC  reg NSR no murmur   RESP: No audible wheeze, cough, or visible cyanosis.    SKIN: Visible skin clear. No significant rash, abnormal pigmentation or lesions.  NEURO: Cranial nerves grossly intact.  Mentation and speech appropriate for age.  PSYCH: Appropriate affect, tone, and pace of words.   Foot exam - pulses 2/4 and equal bilaterally - monofilament testing intact on both feet           Signed Electronically by: Libby Black MD PhD      "

## 2025-04-16 ENCOUNTER — OFFICE VISIT (OUTPATIENT)
Dept: FAMILY MEDICINE | Facility: CLINIC | Age: 70
End: 2025-04-16
Payer: COMMERCIAL

## 2025-04-16 VITALS
BODY MASS INDEX: 29.46 KG/M2 | HEIGHT: 65 IN | HEART RATE: 107 BPM | TEMPERATURE: 98.1 F | OXYGEN SATURATION: 93 % | WEIGHT: 176.8 LBS | DIASTOLIC BLOOD PRESSURE: 76 MMHG | RESPIRATION RATE: 16 BRPM | SYSTOLIC BLOOD PRESSURE: 137 MMHG

## 2025-04-16 DIAGNOSIS — I10 PRIMARY HYPERTENSION: ICD-10-CM

## 2025-04-16 DIAGNOSIS — E11.3291: Primary | ICD-10-CM

## 2025-04-16 DIAGNOSIS — Z13.220 SCREENING FOR HYPERLIPIDEMIA: ICD-10-CM

## 2025-04-16 DIAGNOSIS — M85.89 OSTEOPENIA OF MULTIPLE SITES: ICD-10-CM

## 2025-04-16 DIAGNOSIS — Z23 ENCOUNTER FOR IMMUNIZATION: ICD-10-CM

## 2025-04-16 DIAGNOSIS — F41.9 ANXIETY: ICD-10-CM

## 2025-04-16 PROCEDURE — 90480 ADMN SARSCOV2 VAC 1/ONLY CMP: CPT | Performed by: FAMILY MEDICINE

## 2025-04-16 PROCEDURE — 3078F DIAST BP <80 MM HG: CPT | Performed by: FAMILY MEDICINE

## 2025-04-16 PROCEDURE — 91320 SARSCV2 VAC 30MCG TRS-SUC IM: CPT | Performed by: FAMILY MEDICINE

## 2025-04-16 PROCEDURE — 99215 OFFICE O/P EST HI 40 MIN: CPT | Mod: 25 | Performed by: FAMILY MEDICINE

## 2025-04-16 PROCEDURE — 3075F SYST BP GE 130 - 139MM HG: CPT | Performed by: FAMILY MEDICINE

## 2025-04-16 RX ORDER — LISINOPRIL 2.5 MG/1
2.5 TABLET ORAL DAILY
Qty: 60 TABLET | Refills: 3 | Status: SHIPPED | OUTPATIENT
Start: 2025-04-16

## 2025-04-16 RX ORDER — IBUPROFEN 200 MG
200 TABLET ORAL 2 TIMES DAILY
COMMUNITY

## 2025-04-16 ASSESSMENT — ANXIETY QUESTIONNAIRES
5. BEING SO RESTLESS THAT IT IS HARD TO SIT STILL: NOT AT ALL
GAD7 TOTAL SCORE: 4
1. FEELING NERVOUS, ANXIOUS, OR ON EDGE: SEVERAL DAYS
6. BECOMING EASILY ANNOYED OR IRRITABLE: SEVERAL DAYS
3. WORRYING TOO MUCH ABOUT DIFFERENT THINGS: SEVERAL DAYS
7. FEELING AFRAID AS IF SOMETHING AWFUL MIGHT HAPPEN: SEVERAL DAYS
IF YOU CHECKED OFF ANY PROBLEMS ON THIS QUESTIONNAIRE, HOW DIFFICULT HAVE THESE PROBLEMS MADE IT FOR YOU TO DO YOUR WORK, TAKE CARE OF THINGS AT HOME, OR GET ALONG WITH OTHER PEOPLE: NOT DIFFICULT AT ALL
GAD7 TOTAL SCORE: 4
2. NOT BEING ABLE TO STOP OR CONTROL WORRYING: NOT AT ALL

## 2025-04-16 ASSESSMENT — PATIENT HEALTH QUESTIONNAIRE - PHQ9
5. POOR APPETITE OR OVEREATING: NOT AT ALL
SUM OF ALL RESPONSES TO PHQ QUESTIONS 1-9: 2

## 2025-04-16 NOTE — PATIENT INSTRUCTIONS
Urine sample today   Blood test in 2 wks   Eye appt yearly  COVID shot today    Wellness visit in Oct

## 2025-04-16 NOTE — PROGRESS NOTES
"      Dorcas Huntley is a 69 year old, presenting for the following health issues:  Follow Up, Diabetes, Lipids, Results (Labs completed a couple weeks ago), and Recheck Medication (Pt has been taking Ibuprofen daily - 200mg tablet twice daily)        4/16/2025     1:09 PM   Additional Questions   Roomed by matteo     History of Present Illness       Diabetes:   She presents for follow up of diabetes.    She is not checking blood glucose.         She has no concerns regarding her diabetes at this time.   She is not experiencing numbness or burning in feet, excessive thirst, blurry vision, weight changes or redness, sores or blisters on feet.           Hyperlipidemia:  She presents for follow up of hyperlipidemia.   She is taking medication to lower cholesterol. She is not having myalgia or other side effects to statin medications.    She eats 4 or more servings of fruits and vegetables daily.She consumes 0 sweetened beverage(s) daily.She exercises with enough effort to increase her heart rate 20 to 29 minutes per day.  She exercises with enough effort to increase her heart rate 7 days per week.   She is taking medications regularly.                      Objective    /76 (BP Location: Right arm, Patient Position: Sitting, Cuff Size: Adult Regular)   Pulse 107   Temp 98.1  F (36.7  C) (Oral)   Resp 16   Ht 1.645 m (5' 4.76\")   Wt 80.2 kg (176 lb 12.8 oz)   SpO2 93%   BMI 29.64 kg/m    Body mass index is 29.64 kg/m .  Physical Exam               Signed Electronically by: Libby Black MD PhD    "

## 2025-07-05 ENCOUNTER — HEALTH MAINTENANCE LETTER (OUTPATIENT)
Age: 70
End: 2025-07-05

## 2025-07-15 DIAGNOSIS — H35.052 CHOROIDAL NEOVASCULAR MEMBRANE OF LEFT EYE: Primary | ICD-10-CM

## 2025-07-22 ENCOUNTER — OFFICE VISIT (OUTPATIENT)
Dept: OPHTHALMOLOGY | Facility: CLINIC | Age: 70
End: 2025-07-22
Attending: OPHTHALMOLOGY
Payer: COMMERCIAL

## 2025-07-22 DIAGNOSIS — E11.3299 NONPROLIFERATIVE DIABETIC RETINOPATHY ASSOCIATED WITH TYPE 2 DIABETES MELLITUS (H): ICD-10-CM

## 2025-07-22 DIAGNOSIS — H44.2D3 BOTH EYES AFFECTED BY DEGENERATIVE MYOPIA WITH FOVEOSCHISIS: Primary | ICD-10-CM

## 2025-07-22 DIAGNOSIS — H35.052 CHOROIDAL NEOVASCULAR MEMBRANE OF LEFT EYE: ICD-10-CM

## 2025-07-22 DIAGNOSIS — Z96.1 PSEUDOPHAKIA OF BOTH EYES: ICD-10-CM

## 2025-07-22 PROCEDURE — 2022F DILAT RTA XM EVC RTNOPTHY: CPT | Performed by: OPHTHALMOLOGY

## 2025-07-22 PROCEDURE — 92134 CPTRZ OPH DX IMG PST SGM RTA: CPT | Performed by: OPHTHALMOLOGY

## 2025-07-22 PROCEDURE — 92014 COMPRE OPH EXAM EST PT 1/>: CPT | Performed by: OPHTHALMOLOGY

## 2025-07-22 PROCEDURE — 99207 FUNDUS PHOTOS OU (BOTH EYES): CPT | Mod: 26 | Performed by: OPHTHALMOLOGY

## 2025-07-22 PROCEDURE — G0463 HOSPITAL OUTPT CLINIC VISIT: HCPCS | Performed by: OPHTHALMOLOGY

## 2025-07-22 PROCEDURE — 92250 FUNDUS PHOTOGRAPHY W/I&R: CPT | Performed by: OPHTHALMOLOGY

## 2025-07-22 ASSESSMENT — SLIT LAMP EXAM - LIDS
COMMENTS: NORMAL
COMMENTS: NORMAL

## 2025-07-22 ASSESSMENT — TONOMETRY
IOP_METHOD: TONOPEN
OD_IOP_MMHG: 17
OS_IOP_MMHG: 15

## 2025-07-22 ASSESSMENT — CUP TO DISC RATIO
OS_RATIO: 0.3
OD_RATIO: 0.4

## 2025-07-22 ASSESSMENT — CONF VISUAL FIELD
OS_SUPERIOR_TEMPORAL_RESTRICTION: 0
OS_INFERIOR_NASAL_RESTRICTION: 0
OD_INFERIOR_TEMPORAL_RESTRICTION: 3
OS_NORMAL: 1
OS_INFERIOR_TEMPORAL_RESTRICTION: 0
OS_SUPERIOR_NASAL_RESTRICTION: 0

## 2025-07-22 ASSESSMENT — REFRACTION_WEARINGRX
OS_AXIS: 078
SPECS_TYPE: PAL
OD_SPHERE: -3.25
OS_SPHERE: -3.25
OD_CYLINDER: +0.50
OS_CYLINDER: +0.75
OD_ADD: +2.75
OS_ADD: +2.75
OD_AXIS: 097

## 2025-07-22 ASSESSMENT — VISUAL ACUITY
OS_CC: 20/40
OS_PH_CC+: -2
CORRECTION_TYPE: GLASSES
OS_PH_CC: 20/30
METHOD: SNELLEN - LINEAR
OS_CC+: -1
OD_CC+: -1
OD_CC: 20/25

## 2025-07-22 ASSESSMENT — EXTERNAL EXAM - RIGHT EYE: OD_EXAM: NORMAL

## 2025-07-22 ASSESSMENT — EXTERNAL EXAM - LEFT EYE: OS_EXAM: NORMAL

## 2025-07-22 NOTE — PROGRESS NOTES
CC -   Myopic Degeneration    INTERVAL HISTORY -vision improved significantly after CE IOL each eye (Hernandez)  CE IOL + avastin left eye on 01/02/2023  CE IOL right eye 01/09/2023      HPI -   Yuko Blanchard is a  67 year old year-old patient presented for myopic degeneration OU and diabetic eye exam. She was previously following with Dr. Murtaza Trinh MD at [facility].     Past ocular history of Myopic Degeneration with Retinal Neovascularization OU, Type 2 DM with no insulin use (diagnosed approximately 2018), ERM OS, PVD OU, and cataract OU. Denies curtains, flashing lights, increase in floaters. Reports stable vision.    She had a history of Avastin injections OU (uncertain about the exact number); starting roughly 10 years prior in the left eye and 2.5 years ago in the right eye. Her last injection each eye 9/14/2022    PAST OCULAR SURGERY  -Denies ocular surgery and laser procedures    RETINAL IMAGING:  OCT 7/22/25  OD - PHF off of macula; ERM with schisis over the post staphyloma area and a small new subretinal fluid  OS - PHF off of macula; ERM with schisis and subretinal fluid inferiorly;  resolved SRF and small persistent IRF; stable     FAF 08/10/22  OD- areas of hypo-AF nasal and inferonasal to fovea; window defect at macula but no active CNV  OS- Scattered hyper-AF within large area of hypo-AF inferior macula with leakage     FAF compatible with examination and findings    ASSESSMENT & PLAN    # Myopic Degeneration with CNV OU    -s/p Avastin each eye; left eye last inj 9/14/2022   -No active hemorrhage noted    -right eye no signs of active CNVM but some increased myopic schisis; left eye subretinal fluid resolved     - I think SRF and IRF (schisis) is mostly likely due to myopic degeneration (over posterior staphyloma area) and CNVM has regressed   - RTC 9-12 months for DFE and OCT    # PVD OU    -Signs and symptoms of RD/RT discussed 08/10/22    # DM Type II with mild NPDR right eye  no signs of DR left eye   -Last A1c 6.0 on 10/2022   -Good BP and blood glucose control recommended    # High Myopia OU   - peripheral examination unremarkable   - 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 Hernandez; observe   - Retina detachment precautions were discussed with the patient (presence or increased in flashes, floaters or a curtain in the visual field) and was asked to return if any of the those occur    return to clinic: 9-12 months, OCT each eye (30 and 55 degrees) and optos each eye     Complete documentation of historical and exam elements from today's encounter can be found in the full encounter summary report (not reduplicated in this progress note). I personally obtained the chief complaint(s) and history of present illness.  I confirmed and edited as necessary the review of systems, past medical/surgical history, family history, social history, and examination findings as documented by others; and I examined the patient myself. I personally reviewed the relevant tests, images, and reports as documented above. I formulated and edited as necessary the assessment and plan and discussed the findings and management plan with the patient and family.     Solo Junior MD

## 2025-08-18 DIAGNOSIS — Z13.220 SCREENING FOR HYPERLIPIDEMIA: ICD-10-CM

## 2025-08-19 RX ORDER — ATORVASTATIN CALCIUM 10 MG/1
10 TABLET, FILM COATED ORAL DAILY
Qty: 90 TABLET | Refills: 2 | Status: SHIPPED | OUTPATIENT
Start: 2025-08-19

## (undated) DEVICE — LINEN TOWEL PACK X5 5464

## (undated) DEVICE — EYE CANN IRR 27GA ANTERIOR CHAMBER 581280

## (undated) DEVICE — GOWN IMPERVIOUS 2XL BLUE

## (undated) DEVICE — PACK CATARACT CUSTOM ASC SEY15CPUMC

## (undated) DEVICE — SOL WATER IRRIG 500ML BOTTLE 2F7113

## (undated) DEVICE — SUCTION MANIFOLD NEPTUNE 2 SYS 1 PORT 702-025-000

## (undated) DEVICE — TUBING SUCTION 12"X1/4" N612

## (undated) DEVICE — EYE TIP IRRIGATION & ASPIRATION POLYMER CVD 0.3MM 8065751512

## (undated) DEVICE — GLOVE PROTEXIS MICRO 7.5  2D73PM75

## (undated) DEVICE — EYE CANN IRR 25GA CYSTOTOME 581610

## (undated) DEVICE — EYE SHIELD PLASTIC

## (undated) DEVICE — EYE KNIFE SLIT XSTAR VISITEC 2.6MM 45DEG 373726

## (undated) DEVICE — EYE KNIFE STILETTO VISITEC 1.1MM ANG 45DEG SIDEPORT 376620

## (undated) DEVICE — EYE PACK CUSTOM ANTERIOR 30DEG TIP CENTURION PPK6682-04

## (undated) DEVICE — SPECIMEN CONTAINER 3OZ W/FORMALIN 59901

## (undated) DEVICE — KIT ENDO TURNOVER/PROCEDURE CARRY-ON 101822

## (undated) DEVICE — SNARE CAPIVATOR ROUND COLD SNR BX10 M00561101

## (undated) DEVICE — FCP BIOPSY RADIAL JAW 4 JUMBO 3.2MM CHANNEL M00513370

## (undated) RX ORDER — FENTANYL CITRATE 50 UG/ML
INJECTION, SOLUTION INTRAMUSCULAR; INTRAVENOUS
Status: DISPENSED
Start: 2022-10-13

## (undated) RX ORDER — ONDANSETRON 2 MG/ML
INJECTION INTRAMUSCULAR; INTRAVENOUS
Status: DISPENSED
Start: 2022-10-13

## (undated) RX ORDER — FENTANYL CITRATE 50 UG/ML
INJECTION, SOLUTION INTRAMUSCULAR; INTRAVENOUS
Status: DISPENSED
Start: 2023-01-09

## (undated) RX ORDER — DIPHENHYDRAMINE HYDROCHLORIDE 50 MG/ML
INJECTION INTRAMUSCULAR; INTRAVENOUS
Status: DISPENSED
Start: 2022-10-13

## (undated) RX ORDER — FENTANYL CITRATE 50 UG/ML
INJECTION, SOLUTION INTRAMUSCULAR; INTRAVENOUS
Status: DISPENSED
Start: 2023-01-02

## (undated) RX ORDER — BEVACIZUMAB 1.25MG/.05
SYRINGE (ML) INTRAOCULAR
Status: DISPENSED
Start: 2023-01-02

## (undated) RX ORDER — ACETAMINOPHEN 325 MG/1
TABLET ORAL
Status: DISPENSED
Start: 2023-01-02